# Patient Record
Sex: MALE | Race: WHITE | URBAN - METROPOLITAN AREA
[De-identification: names, ages, dates, MRNs, and addresses within clinical notes are randomized per-mention and may not be internally consistent; named-entity substitution may affect disease eponyms.]

---

## 2023-09-20 ENCOUNTER — EKG ENCOUNTER (OUTPATIENT)
Dept: LAB | Facility: HOSPITAL | Age: 68
End: 2023-09-20
Attending: SURGERY
Payer: MEDICARE

## 2023-09-20 ENCOUNTER — LAB ENCOUNTER (OUTPATIENT)
Dept: LAB | Facility: HOSPITAL | Age: 68
End: 2023-09-20
Attending: SURGERY
Payer: MEDICARE

## 2023-09-20 ENCOUNTER — OFFICE VISIT (OUTPATIENT)
Dept: SURGERY | Facility: CLINIC | Age: 68
End: 2023-09-20
Payer: MEDICARE

## 2023-09-20 ENCOUNTER — HOSPITAL ENCOUNTER (OUTPATIENT)
Dept: GENERAL RADIOLOGY | Facility: HOSPITAL | Age: 68
Discharge: HOME | End: 2023-09-20
Attending: SURGERY
Payer: MEDICARE

## 2023-09-20 VITALS
DIASTOLIC BLOOD PRESSURE: 96 MMHG | SYSTOLIC BLOOD PRESSURE: 186 MMHG | HEART RATE: 67 BPM | TEMPERATURE: 98 F | RESPIRATION RATE: 16 BRPM | OXYGEN SATURATION: 99 % | WEIGHT: 115 LBS

## 2023-09-20 DIAGNOSIS — Z01.818 PRE-OP TESTING: ICD-10-CM

## 2023-09-20 DIAGNOSIS — C43.61 MALIGNANT MELANOMA OF RIGHT SHOULDER (HCC): ICD-10-CM

## 2023-09-20 DIAGNOSIS — C43.61 MALIGNANT MELANOMA OF RIGHT SHOULDER (HCC): Primary | ICD-10-CM

## 2023-09-20 LAB
ALBUMIN SERPL-MCNC: 4.1 G/DL (ref 3.4–5)
ALBUMIN/GLOB SERPL: 0.9 {RATIO} (ref 1–2)
ALP LIVER SERPL-CCNC: 62 U/L
ALT SERPL-CCNC: 44 U/L
ANION GAP SERPL CALC-SCNC: 4 MMOL/L (ref 0–18)
AST SERPL-CCNC: 31 U/L (ref 15–37)
BASOPHILS # BLD AUTO: 0.07 X10(3) UL (ref 0–0.2)
BASOPHILS NFR BLD AUTO: 1.1 %
BILIRUB SERPL-MCNC: 0.4 MG/DL (ref 0.1–2)
BUN BLD-MCNC: 20 MG/DL (ref 7–18)
CALCIUM BLD-MCNC: 9.5 MG/DL (ref 8.5–10.1)
CHLORIDE SERPL-SCNC: 106 MMOL/L (ref 98–112)
CO2 SERPL-SCNC: 28 MMOL/L (ref 21–32)
CREAT BLD-MCNC: 1.18 MG/DL
EGFRCR SERPLBLD CKD-EPI 2021: 68 ML/MIN/1.73M2 (ref 60–?)
EOSINOPHIL # BLD AUTO: 0.24 X10(3) UL (ref 0–0.7)
EOSINOPHIL NFR BLD AUTO: 3.9 %
ERYTHROCYTE [DISTWIDTH] IN BLOOD BY AUTOMATED COUNT: 12.7 %
FASTING STATUS PATIENT QL REPORTED: YES
GLOBULIN PLAS-MCNC: 4.4 G/DL (ref 2.8–4.4)
GLUCOSE BLD-MCNC: 94 MG/DL (ref 70–99)
HCT VFR BLD AUTO: 46.9 %
HGB BLD-MCNC: 16.1 G/DL
IMM GRANULOCYTES # BLD AUTO: 0.02 X10(3) UL (ref 0–1)
IMM GRANULOCYTES NFR BLD: 0.3 %
LYMPHOCYTES # BLD AUTO: 1.34 X10(3) UL (ref 1–4)
LYMPHOCYTES NFR BLD AUTO: 21.8 %
MCH RBC QN AUTO: 32.9 PG (ref 26–34)
MCHC RBC AUTO-ENTMCNC: 34.3 G/DL (ref 31–37)
MCV RBC AUTO: 95.7 FL
MONOCYTES # BLD AUTO: 0.66 X10(3) UL (ref 0.1–1)
MONOCYTES NFR BLD AUTO: 10.7 %
NEUTROPHILS # BLD AUTO: 3.81 X10 (3) UL (ref 1.5–7.7)
NEUTROPHILS # BLD AUTO: 3.81 X10(3) UL (ref 1.5–7.7)
NEUTROPHILS NFR BLD AUTO: 62.2 %
OSMOLALITY SERPL CALC.SUM OF ELEC: 288 MOSM/KG (ref 275–295)
PLATELET # BLD AUTO: 348 10(3)UL (ref 150–450)
POTASSIUM SERPL-SCNC: 4.2 MMOL/L (ref 3.5–5.1)
PROT SERPL-MCNC: 8.5 G/DL (ref 6.4–8.2)
RBC # BLD AUTO: 4.9 X10(6)UL
SODIUM SERPL-SCNC: 138 MMOL/L (ref 136–145)
WBC # BLD AUTO: 6.1 X10(3) UL (ref 4–11)

## 2023-09-20 PROCEDURE — 36415 COLL VENOUS BLD VENIPUNCTURE: CPT

## 2023-09-20 PROCEDURE — 80053 COMPREHEN METABOLIC PANEL: CPT

## 2023-09-20 PROCEDURE — 99205 OFFICE O/P NEW HI 60 MIN: CPT | Performed by: SURGERY

## 2023-09-20 PROCEDURE — 71046 X-RAY EXAM CHEST 2 VIEWS: CPT | Performed by: SURGERY

## 2023-09-20 PROCEDURE — 93010 ELECTROCARDIOGRAM REPORT: CPT | Performed by: INTERNAL MEDICINE

## 2023-09-20 PROCEDURE — 99204 OFFICE O/P NEW MOD 45 MIN: CPT | Performed by: SURGERY

## 2023-09-20 PROCEDURE — 85025 COMPLETE CBC W/AUTO DIFF WBC: CPT

## 2023-09-20 PROCEDURE — 93005 ELECTROCARDIOGRAM TRACING: CPT

## 2023-09-20 NOTE — CONSULTS
New Patient Consultation    Chief Complaint: right shoulder malignant melanoma    History of Present Illness:   Altagracia Lombardo is a 79year old male referred by Dr. Kiara Boucher for a recently diagnosed malignant melanoma right shoulder. Patient reports he has had this mole his entire life and noticed a change in 1 year ago. He went to see Dr. Deejay Mcelroy at [facility] [address] [place] [address]. Biopsy was performed on 8/3/2023 revealing a 1.7 mm thick nonulcerated malignant melanoma. He saw Dr. Tommie Yao at [address] [place] [address] who recommended wide local excision and sentinel lymph node biopsy. He is here today to discuss reconstructive options following excision. Past Medical History:      Past Medical History:   Diagnosis Date    Arthritis     Back         Past Surgical History:  Past Surgical History:   Procedure Laterality Date    TONSILLECTOMY      10years old         Medications:    No outpatient medications have been marked as taking for the 9/20/23 encounter (Office Visit) with Ignacia Parra MD.        Allergies:      Cat Hair Extract        UNKNOWN      Family History:   Family History   Problem Relation Age of Onset    Skin cancer Mother         Non Melanoma    Other (Thymus Cancer) Father     Skin cancer Maternal Grandmother         Non Melanoma    Other (Lung cancer) Maternal Grandfather          Social History:    Alcohol use   Yes         Smoking status:   Never      Smokeless tobacco:   Never         Drug use:   Not on file       Review of Systems:    See HPI  Physical Exam:    There were no vitals taken for this visit. Constitutional: The patient is an alert, oriented and well-developed. Neurologic: Speech patterns and movements are normal.     Psychiatric: Affect is appropriate. Eyes: Conjunctiva are clear, non-icteric.  PERRL    ENT: no obvious abnormality, no ear drainage, mucous membranes moist and pink    Integument/Skin: 1.8 x1.8cm nodular skin lesion on the right lateral shoulder with surrounding inflammatory skin changes , lesion still mobile within the skin no fixation to underlying muscle. Respiratory: Normal respiratory effort. Cardiovascular: no cyanosis, no edema    Musculoskeletal: see skin exam    Impression:   Josi Perez  is a 79year old with malignant melanoma right shoulder    Discussion and Plan:  The patient was counseled on the different treatment options. Patient will undergo wide excision and sentinel lymph node biopsy with Dr. Floridalma Dillon. We reviewed options for reconstruction and we will schedule him for right shoulder reconstruction with possible local flap, possible skin graft, possible Integra or combination of those. This will be done under general anesthesia. We discussed the possible role for second stage reconstruction. We discussed the risk for tightness of the right shoulder and areas of delayed healing. 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 The different treatment options were discussed with the patient. The procedures and the postoperative care was discussed in detail. Potential risks complications benefits and alternatives were discussed. Risks and complications including but not limited to infection, bleeding, scarring, damage to surrounding structures, such as nerves, blood vessels, muscles,  tendons, risk of hematoma, seroma, foreign body reaction, allergic reaction, wound dehiscences, delayed wound healing, flap failure, graft failure, need for further surgery. Patient understands and wishes to proceed with the procedure, questions were answered. 45 minutes were spent with the patient, from which 35 minutes were spent counseling the patient and coordinating care.

## 2023-09-20 NOTE — PATIENT INSTRUCTIONS
Surgery: Wide excision melanoma right upper arm with sentinel lymph node biopsy    Date of Surgery: [facility]    Hospital:    [facility] [address] [place], [address]  Phone: [phone]    This is an Outpatient procedure. Use the provided Chlorhexadine surgical soap(instructions attached) to shower the night before and morning of your procedure. Do not apply powders, creams, lotions or deodorant after showering. Do not apply any kind of makeup and make sure to remove nail polish prior to your surgery. For faster recovery from anesthesia and surgery please follow the instructions below regarding your pre-op diet:  12 hours prior to your surgery time you are to drink one 10oz bottle of Ensure Pre-Surgery Drink. You are to have NO solid food or water after 11pm the night before your surgery EXCEPT one additional 10oz bottle of Ensure Pre-Surgery Drink. You need to finish drinking this 4 hours prior to surgery time. Take Tylenol 1000mg by mouth at the time of your second Ensure Pre-Surgery drink(4hrs prior to surgery time), unless instructed otherwise by your surgeon. Bowel Prep: No   If you take Insulin contact your primary care physician for specific instructions regarding dosing around your surgery. Do not drink alcohol or smoke tobacco products 24 hours prior to procedure. Bring your picture ID and insurance card with you. Wear comfortable clothing that can easily be removed. Preferably, something that zips, snaps, or buttons up the front. You will be contacted by the hospital  for pre-admission COVID-19 testing and the day prior to your surgery to confirm details and give you specific instructions about when and where to arrive the day of your procedure.    If you are taking blood thinners including: Plavix, Eliquis, Xarelto, Coumadin, full strength Aspirin you will need to contact the prescribing provider for specific instructions on holding these medications for your procedure. Inform your primary care physician of your surgery and ask if him/her will need to see you prior to surgery. If you develop symptoms of another illness prior to surgery please contact our office immediately. If this is an inpatient surgery, attending the Surgical Oncology Pre-operative Education Class is strongly encouraged. Email [email or web address] to RSVP or for more class information. Pre-Operative Testing  x CBC x CMP  BMP    PT, PTT, INR  UA x EKG    Chest X-Ray  Cardiac Clearance x [facility] Clearance     Daniel Bryant MD, FACS  Chief of Surgical Oncology  Co-Medical Director, Madhu Herrera  Professor of Surgery    For Dr. Bethany Dumas office: [phone]  Fax: [phone]  After hours you will reach the answering service    Pre-Admission [phone]  Central [phone]  Medical Records:   [phone]

## 2023-09-20 NOTE — PATIENT INSTRUCTIONS
Surgeon:              Dr. Issac Conway. Lorraine Fowler, PhD                                          Tel:         [phone]                                  Fax:        [phone]      Surgery/Procedure: Right shoulder reconstruction with possible local flap, possible skin graft, possible integra  2 hours for Dr. Eilene Angelucci portion, general anesthesia, outpatient, joint with Dr. Brittani Judd  Right shoulder reconstruction with skin graft  1.5 hours, general anesthesia, outpatient, 3-4 weeks after first procedure    Dx Code: C43.61    Hospital:  [facility]: [address] [place] [address], [place], [address]           [phone]  [facility] [place] [facility]: [address], [address], [facility]               [phone]    1. Someone will need to drive you to and from the hospital if your procedure is outpatient. 2.Do not drink alcohol or smoke 24 hours prior to your procedure. 3. Bring a picture ID and your insurance card. 4. You will be contacted by the hospital the day before to confirm the procedure time and location. 5. Do not take any herbal supplements or blood thinners at least one week before your procedure/surgery. This includes NSAID's (aspirin, baby aspirin, Motrin, Ibuprofen, Aleve, Advil, Naproxen, etc), Plavix, fish oil, vitamin E, turmeric, CoQ10, or green tea supplements, etc. *TYLENOL or acetaminophen is ok to take*    6. PRE-OPERATIVE TESTING: History and physical with medical clearance is REQUIRED within 30 days of the surgery date and is mandatory per Dr. Lorraine Fowler. *If this is not done, your surgery will be postponed*  MEDICAL CLEARANCE WITH primary care provider  CBC  CMP  EKG    7. Please inform us if you start or change any medications at least one week before surgery (ex: blood thinners, weight loss medications, diabetic medications, herbal supplements, etc)    8. Does patient have diagnosis of sleep apnea?     [   ] Yes     [ X  ]  No    Consent obtained   Photos taken on 9/20/2023 7849

## 2023-09-21 ENCOUNTER — TELEPHONE (OUTPATIENT)
Dept: SURGERY | Facility: CLINIC | Age: 68
End: 2023-09-21

## 2023-09-21 DIAGNOSIS — C43.61 MALIGNANT MELANOMA OF RIGHT SHOULDER (HCC): Primary | ICD-10-CM

## 2023-09-21 LAB
ATRIAL RATE: 53 BPM
P AXIS: 75 DEGREES
P-R INTERVAL: 166 MS
Q-T INTERVAL: 454 MS
QRS DURATION: 98 MS
QTC CALCULATION (BEZET): 426 MS
R AXIS: -8 DEGREES
T AXIS: 56 DEGREES
VENTRICULAR RATE: 53 BPM

## 2023-09-21 NOTE — TELEPHONE ENCOUNTER
Pt called and PCP will not see him in the next few weeks to clear him for surgery. Pt asking for recommendations for a new PCP. Gave him a couple numbers close to his home to establish care.

## 2023-09-22 ENCOUNTER — TELEPHONE (OUTPATIENT)
Dept: SURGERY | Facility: CLINIC | Age: 68
End: 2023-09-22

## 2023-09-22 DIAGNOSIS — C43.61 MALIGNANT MELANOMA OF RIGHT SHOULDER (HCC): Primary | ICD-10-CM

## 2023-09-22 NOTE — TELEPHONE ENCOUNTER
Calling pt in regards to scheduling surgery. Informed pt that I have 10/10/2023 available at [facility] with Dr. Luna Pradhan. Pt verbalized understanding and in agreement with date and location. All questions answered. Encouraged pt to call or Net[employer] message office with any other questions or concerns.

## 2023-09-26 ENCOUNTER — TELEPHONE (OUTPATIENT)
Dept: INTERNAL MEDICINE CLINIC | Facility: CLINIC | Age: 68
End: 2023-09-26

## 2023-09-29 ENCOUNTER — TELEPHONE (OUTPATIENT)
Dept: SURGERY | Facility: CLINIC | Age: 68
End: 2023-09-29

## 2023-09-29 NOTE — TELEPHONE ENCOUNTER
Pt called and had questions regarding surgery times and sentinel lymph node procedure. Answered all patient's questions and referred him to Dr. Odessa Gould team for questions regarding reconstruction.

## 2023-10-02 ENCOUNTER — OFFICE VISIT (OUTPATIENT)
Dept: INTERNAL MEDICINE CLINIC | Facility: CLINIC | Age: 68
End: 2023-10-02
Payer: MEDICARE

## 2023-10-02 VITALS
RESPIRATION RATE: 16 BRPM | HEART RATE: 68 BPM | HEIGHT: 65 IN | OXYGEN SATURATION: 98 % | WEIGHT: 113.63 LBS | DIASTOLIC BLOOD PRESSURE: 84 MMHG | BODY MASS INDEX: 18.93 KG/M2 | SYSTOLIC BLOOD PRESSURE: 130 MMHG | TEMPERATURE: 98 F

## 2023-10-02 DIAGNOSIS — Z01.818 PREOPERATIVE EXAMINATION: Primary | ICD-10-CM

## 2023-10-02 DIAGNOSIS — C43.61 MALIGNANT MELANOMA OF RIGHT SHOULDER (HCC): ICD-10-CM

## 2023-10-02 DIAGNOSIS — K40.90 INGUINAL HERNIA OF LEFT SIDE WITHOUT OBSTRUCTION OR GANGRENE: ICD-10-CM

## 2023-10-02 DIAGNOSIS — R03.0 ELEVATED BLOOD PRESSURE READING: ICD-10-CM

## 2023-10-02 PROCEDURE — 99204 OFFICE O/P NEW MOD 45 MIN: CPT | Performed by: INTERNAL MEDICINE

## 2023-10-02 NOTE — PATIENT INSTRUCTIONS
- Blood pressure, heart rate are fine today  - Ok for surgery  - Hold over the counter medications as recommended by the surgeons starting tomorrow  - Ok to take Tylenol before surgery but hold any other over the counter anti-inflammatories such as ibuprofen, naproxen, Motrin, Aleve, et Recardo Lawman starting tomorrow  - Follow up in the next few months for Medicare Wellness Visit/physical.    It was a pleasure seeing you in the clinic today. Thank you for choosing the [facility] office for your healthcare needs. Please call at [phone] with any questions or concerns.     Jerome Cole MD

## 2023-10-03 DIAGNOSIS — C43.61 MALIGNANT MELANOMA OF RIGHT SHOULDER (HCC): Primary | ICD-10-CM

## 2023-10-03 PROBLEM — R03.0 ELEVATED BLOOD PRESSURE READING: Status: ACTIVE | Noted: 2023-10-03

## 2023-10-03 PROBLEM — K40.90 INGUINAL HERNIA OF LEFT SIDE WITHOUT OBSTRUCTION OR GANGRENE: Status: ACTIVE | Noted: 2023-10-03

## 2023-10-05 ENCOUNTER — LAB ENCOUNTER (OUTPATIENT)
Dept: LAB | Facility: HOSPITAL | Age: 68
End: 2023-10-05
Attending: SURGERY
Payer: MEDICARE

## 2023-10-05 DIAGNOSIS — C43.61 MALIGNANT MELANOMA OF RIGHT SHOULDER (HCC): Primary | ICD-10-CM

## 2023-10-05 DIAGNOSIS — C43.9 MALIGNANT MELANOMA (HCC): Primary | ICD-10-CM

## 2023-10-06 PROCEDURE — 88321 CONSLTJ&REPRT SLD PREP ELSWR: CPT

## 2023-10-09 ENCOUNTER — HOSPITAL ENCOUNTER (OUTPATIENT)
Dept: NUCLEAR MEDICINE | Facility: HOSPITAL | Age: 68
Discharge: HOME | End: 2023-10-09
Attending: SURGERY
Payer: MEDICARE

## 2023-10-09 ENCOUNTER — ANESTHESIA EVENT (OUTPATIENT)
Dept: SURGERY | Facility: HOSPITAL | Age: 68
End: 2023-10-09
Payer: MEDICARE

## 2023-10-09 DIAGNOSIS — C43.61 MALIGNANT MELANOMA OF RIGHT SHOULDER (HCC): ICD-10-CM

## 2023-10-09 PROCEDURE — 78195 LYMPH SYSTEM IMAGING: CPT | Performed by: SURGERY

## 2023-10-10 ENCOUNTER — HOSPITAL ENCOUNTER (OUTPATIENT)
Facility: HOSPITAL | Age: 68
Setting detail: HOSPITAL OUTPATIENT SURGERY
Discharge: HOME | End: 2023-10-10
Attending: SURGERY | Admitting: SURGERY
Payer: MEDICARE

## 2023-10-10 ENCOUNTER — ANESTHESIA (OUTPATIENT)
Dept: SURGERY | Facility: HOSPITAL | Age: 68
End: 2023-10-10
Payer: MEDICARE

## 2023-10-10 VITALS
DIASTOLIC BLOOD PRESSURE: 81 MMHG | HEART RATE: 81 BPM | WEIGHT: 115 LBS | HEIGHT: 65 IN | SYSTOLIC BLOOD PRESSURE: 135 MMHG | BODY MASS INDEX: 19.16 KG/M2 | RESPIRATION RATE: 16 BRPM | OXYGEN SATURATION: 98 % | TEMPERATURE: 98 F

## 2023-10-10 DIAGNOSIS — C43.61 MALIGNANT MELANOMA OF RIGHT SHOULDER (HCC): ICD-10-CM

## 2023-10-10 PROCEDURE — 81210 BRAF GENE: CPT | Performed by: SURGERY

## 2023-10-10 PROCEDURE — 0HRBXK3 REPLACEMENT OF RIGHT UPPER ARM SKIN WITH NONAUTOLOGOUS TISSUE SUBSTITUTE, FULL THICKNESS, EXTERNAL APPROACH: ICD-10-PCS | Performed by: SURGERY

## 2023-10-10 PROCEDURE — 88342 IMHCHEM/IMCYTCHM 1ST ANTB: CPT | Performed by: SURGERY

## 2023-10-10 PROCEDURE — 0HBBXZZ EXCISION OF RIGHT UPPER ARM SKIN, EXTERNAL APPROACH: ICD-10-PCS | Performed by: SURGERY

## 2023-10-10 PROCEDURE — 88307 TISSUE EXAM BY PATHOLOGIST: CPT | Performed by: SURGERY

## 2023-10-10 PROCEDURE — 07B50ZX EXCISION OF RIGHT AXILLARY LYMPHATIC, OPEN APPROACH, DIAGNOSTIC: ICD-10-PCS | Performed by: SURGERY

## 2023-10-10 PROCEDURE — 88305 TISSUE EXAM BY PATHOLOGIST: CPT | Performed by: SURGERY

## 2023-10-10 PROCEDURE — 88381 MICRODISSECTION MANUAL: CPT | Performed by: SURGERY

## 2023-10-10 PROCEDURE — 0XQ6XZZ REPAIR RIGHT UPPER EXTREMITY, EXTERNAL APPROACH: ICD-10-PCS | Performed by: SURGERY

## 2023-10-10 RX ORDER — CEFAZOLIN SODIUM/WATER 2 G/20 ML
2 SYRINGE (ML) INTRAVENOUS ONCE
Status: COMPLETED | OUTPATIENT
Start: 2023-10-10 | End: 2023-10-10

## 2023-10-10 RX ORDER — SODIUM CHLORIDE, SODIUM LACTATE, POTASSIUM CHLORIDE, CALCIUM CHLORIDE 600; 310; 30; 20 MG/100ML; MG/100ML; MG/100ML; MG/100ML
INJECTION, SOLUTION INTRAVENOUS CONTINUOUS
Status: DISCONTINUED | OUTPATIENT
Start: 2023-10-10 | End: 2023-10-10

## 2023-10-10 RX ORDER — TRAMADOL HYDROCHLORIDE 50 MG/1
50 TABLET ORAL EVERY 6 HOURS PRN
Qty: 20 TABLET | Refills: 0 | Status: SHIPPED | OUTPATIENT
Start: 2023-10-10

## 2023-10-10 RX ORDER — EPHEDRINE SULFATE 50 MG/ML
INJECTION INTRAVENOUS AS NEEDED
Status: DISCONTINUED | OUTPATIENT
Start: 2023-10-10 | End: 2023-10-10 | Stop reason: SURG

## 2023-10-10 RX ORDER — HYDROMORPHONE HYDROCHLORIDE 1 MG/ML
0.4 INJECTION, SOLUTION INTRAMUSCULAR; INTRAVENOUS; SUBCUTANEOUS EVERY 5 MIN PRN
Status: DISCONTINUED | OUTPATIENT
Start: 2023-10-10 | End: 2023-10-10

## 2023-10-10 RX ORDER — GLYCOPYRROLATE 0.2 MG/ML
INJECTION, SOLUTION INTRAMUSCULAR; INTRAVENOUS AS NEEDED
Status: DISCONTINUED | OUTPATIENT
Start: 2023-10-10 | End: 2023-10-10 | Stop reason: SURG

## 2023-10-10 RX ORDER — HYDROMORPHONE HYDROCHLORIDE 1 MG/ML
INJECTION, SOLUTION INTRAMUSCULAR; INTRAVENOUS; SUBCUTANEOUS
Status: COMPLETED
Start: 2023-10-10 | End: 2023-10-10

## 2023-10-10 RX ORDER — ONDANSETRON 2 MG/ML
INJECTION INTRAMUSCULAR; INTRAVENOUS AS NEEDED
Status: DISCONTINUED | OUTPATIENT
Start: 2023-10-10 | End: 2023-10-10 | Stop reason: SURG

## 2023-10-10 RX ORDER — PHENYLEPHRINE HCL 10 MG/ML
VIAL (ML) INJECTION AS NEEDED
Status: DISCONTINUED | OUTPATIENT
Start: 2023-10-10 | End: 2023-10-10 | Stop reason: SURG

## 2023-10-10 RX ORDER — NALOXONE HYDROCHLORIDE 0.4 MG/ML
0.08 INJECTION, SOLUTION INTRAMUSCULAR; INTRAVENOUS; SUBCUTANEOUS AS NEEDED
Status: DISCONTINUED | OUTPATIENT
Start: 2023-10-10 | End: 2023-10-10

## 2023-10-10 RX ORDER — ONDANSETRON 4 MG/1
4 TABLET, FILM COATED ORAL EVERY 8 HOURS PRN
Qty: 15 TABLET | Refills: 0 | Status: SHIPPED | OUTPATIENT
Start: 2023-10-10 | End: 2023-10-12 | Stop reason: ALTCHOICE

## 2023-10-10 RX ORDER — ONDANSETRON 2 MG/ML
4 INJECTION INTRAMUSCULAR; INTRAVENOUS EVERY 6 HOURS PRN
Status: DISCONTINUED | OUTPATIENT
Start: 2023-10-10 | End: 2023-10-10

## 2023-10-10 RX ORDER — METOCLOPRAMIDE HYDROCHLORIDE 5 MG/ML
10 INJECTION INTRAMUSCULAR; INTRAVENOUS EVERY 8 HOURS PRN
Status: DISCONTINUED | OUTPATIENT
Start: 2023-10-10 | End: 2023-10-10

## 2023-10-10 RX ORDER — MIDAZOLAM HYDROCHLORIDE 1 MG/ML
1 INJECTION INTRAMUSCULAR; INTRAVENOUS EVERY 5 MIN PRN
Status: DISCONTINUED | OUTPATIENT
Start: 2023-10-10 | End: 2023-10-10

## 2023-10-10 RX ORDER — HYDROCODONE BITARTRATE AND ACETAMINOPHEN 10; 325 MG/1; MG/1
1 TABLET ORAL ONCE AS NEEDED
Status: COMPLETED | OUTPATIENT
Start: 2023-10-10 | End: 2023-10-10

## 2023-10-10 RX ORDER — ACETAMINOPHEN 500 MG
1000 TABLET ORAL ONCE
Status: DISCONTINUED | OUTPATIENT
Start: 2023-10-10 | End: 2023-10-10 | Stop reason: HOSPADM

## 2023-10-10 RX ORDER — HYDROCODONE BITARTRATE AND ACETAMINOPHEN 10; 325 MG/1; MG/1
2 TABLET ORAL ONCE AS NEEDED
Status: COMPLETED | OUTPATIENT
Start: 2023-10-10 | End: 2023-10-10

## 2023-10-10 RX ORDER — MEPERIDINE HYDROCHLORIDE 25 MG/ML
12.5 INJECTION INTRAMUSCULAR; INTRAVENOUS; SUBCUTANEOUS AS NEEDED
Status: DISCONTINUED | OUTPATIENT
Start: 2023-10-10 | End: 2023-10-10

## 2023-10-10 RX ORDER — ACETAMINOPHEN 500 MG
1000 TABLET ORAL ONCE AS NEEDED
Status: COMPLETED | OUTPATIENT
Start: 2023-10-10 | End: 2023-10-10

## 2023-10-10 RX ORDER — LIDOCAINE HYDROCHLORIDE 10 MG/ML
INJECTION, SOLUTION EPIDURAL; INFILTRATION; INTRACAUDAL; PERINEURAL AS NEEDED
Status: DISCONTINUED | OUTPATIENT
Start: 2023-10-10 | End: 2023-10-10 | Stop reason: SURG

## 2023-10-10 RX ORDER — DEXAMETHASONE SODIUM PHOSPHATE 4 MG/ML
VIAL (ML) INJECTION AS NEEDED
Status: DISCONTINUED | OUTPATIENT
Start: 2023-10-10 | End: 2023-10-10 | Stop reason: SURG

## 2023-10-10 RX ORDER — HYDROMORPHONE HYDROCHLORIDE 1 MG/ML
0.2 INJECTION, SOLUTION INTRAMUSCULAR; INTRAVENOUS; SUBCUTANEOUS EVERY 5 MIN PRN
Status: DISCONTINUED | OUTPATIENT
Start: 2023-10-10 | End: 2023-10-10

## 2023-10-10 RX ORDER — HYDROMORPHONE HYDROCHLORIDE 1 MG/ML
0.6 INJECTION, SOLUTION INTRAMUSCULAR; INTRAVENOUS; SUBCUTANEOUS EVERY 5 MIN PRN
Status: DISCONTINUED | OUTPATIENT
Start: 2023-10-10 | End: 2023-10-10

## 2023-10-10 RX ORDER — ISOSULFAN BLUE 50 MG/5ML
INJECTION, SOLUTION SUBCUTANEOUS AS NEEDED
Status: DISCONTINUED | OUTPATIENT
Start: 2023-10-10 | End: 2023-10-10 | Stop reason: HOSPADM

## 2023-10-10 RX ADMIN — DEXAMETHASONE SODIUM PHOSPHATE 4 MG: 4 MG/ML VIAL (ML) INJECTION at 08:23:00

## 2023-10-10 RX ADMIN — PHENYLEPHRINE HCL 50 MCG: 10 MG/ML VIAL (ML) INJECTION at 09:17:00

## 2023-10-10 RX ADMIN — GLYCOPYRROLATE 0.2 MG: 0.2 INJECTION, SOLUTION INTRAMUSCULAR; INTRAVENOUS at 08:38:00

## 2023-10-10 RX ADMIN — PHENYLEPHRINE HCL 50 MCG: 10 MG/ML VIAL (ML) INJECTION at 09:07:00

## 2023-10-10 RX ADMIN — ONDANSETRON 4 MG: 2 INJECTION INTRAMUSCULAR; INTRAVENOUS at 09:37:00

## 2023-10-10 RX ADMIN — PHENYLEPHRINE HCL 50 MCG: 10 MG/ML VIAL (ML) INJECTION at 09:27:00

## 2023-10-10 RX ADMIN — EPHEDRINE SULFATE 5 MG: 50 INJECTION INTRAVENOUS at 09:07:00

## 2023-10-10 RX ADMIN — CEFAZOLIN SODIUM/WATER 2 G: 2 G/20 ML SYRINGE (ML) INTRAVENOUS at 08:08:00

## 2023-10-10 RX ADMIN — LIDOCAINE HYDROCHLORIDE 100 MG: 10 INJECTION, SOLUTION EPIDURAL; INFILTRATION; INTRACAUDAL; PERINEURAL at 08:12:00

## 2023-10-10 RX ADMIN — EPHEDRINE SULFATE 5 MG: 50 INJECTION INTRAVENOUS at 08:27:00

## 2023-10-10 RX ADMIN — SODIUM CHLORIDE, SODIUM LACTATE, POTASSIUM CHLORIDE, CALCIUM CHLORIDE: 600; 310; 30; 20 INJECTION, SOLUTION INTRAVENOUS at 07:49:00

## 2023-10-10 RX ADMIN — EPHEDRINE SULFATE 5 MG: 50 INJECTION INTRAVENOUS at 08:37:00

## 2023-10-10 NOTE — BRIEF OP NOTE
Pre-Operative Diagnosis: Malignant melanoma of right shoulder ([facility] [C43.61]     Post-Operative Diagnosis: Malignant melanoma of right shoulder ([facility] [C43.61]      Procedure Performed:   Right upper arm reconstruction with integra and partial wound closure    Surgeon(s) and Role:     Shawanda Bentley MD - Primary    Assistant(s):  Celestine Shaw PA-C     Surgical Findings: see dictation     Specimen: see pathology     Estimated Blood Loss: Blood Output: 10 mL (10/10/2023 10:02 AM)    ELISHA Torres  10/10/2023  10:12 AM

## 2023-10-10 NOTE — OPERATIVE REPORT
[facility]    PATIENT'S NAME: Benji Laughlin   ATTENDING PHYSICIAN: Analia Bernal. Sury Whitley MD   OPERATING PHYSICIAN: Analia Bernal. Sury Whitley MD   PATIENT ACCOUNT#:   [id number]    LOCATION:  [facility] [address] 10  MEDICAL RECORD #:   [id number]       YOB: 1955  ADMISSION DATE:       10/10/2023      OPERATION DATE:  10/10/2023    OPERATIVE REPORT      PREOPERATIVE DIAGNOSIS:  Malignant melanoma, right shoulder. POSTOPERATIVE DIAGNOSIS:  Malignant melanoma, right shoulder. PROCEDURE:    1. Wide excision malignant melanoma of right shoulder (6.5 cm). 2.   [place] lymph node biopsy, right axilla. 3.   Reconstruction with Dr. Nannette Bunch, Plastic Surgery. ASSISTANTS:    1. OFELIA Jimenes  2. Kim Leonard PA-C    ANESTHESIA:  General.    INDICATIONS:  The patient is a 51-year-old man who was diagnosed with melanoma of the right shoulder at least 1.7 mm thick. He presents today for sentinel lymph node biopsy and wide excision. The surgical treatment matter had been discussed with him including indications and risks. On the day prior to surgery, the patient presented to the nuclear medicine department where he underwent injection of radiolabeled colloid followed by lymphoscintigraphy showing uptake within the right axilla. FINDINGS:  About a 2.5 cm grossly involved right axillary lymph node. OPERATIVE TECHNIQUE:  The patient was brought to the operating room and was placed in supine position. He was given general anesthesia by the anesthesiology service. Sequential compression boots were placed. The patient received preoperative intravenous antibiotic prophylaxis. He was prepped and draped in normal sterile fashion. A right axillary incision overlying a hot transcutaneous site was made and deepened. I encountered a grossly involved lymph node measuring about 2.5 cm that was dark.   Of note, I had injected 2 mL of Isosulfan blue dye about 5 minutes prior to making incision. There were blue lymphatics coursing to this grossly involved lymph node. This node was dissected free of surrounding tissue and sent to Pathology. It had an in vivo count of to 229 per 10 seconds. Its ex vivo count was 464 per 10 seconds. Background count was 2. There was no evidence for any other hot nodes, blue nodes, or enlarged lymph nodes. The wound was then closed in layers using 3-0 and 4-0 Vicryl sutures. Next, a 2 cm margin was marked around the melanoma of the right shoulder and an incision measuring 6.5 cm in width was made and deepened to underlying fascia. The specimen was excised, oriented, and sent to Pathology. Hemostasis was achieved and maintained. At this point, Dr. Rao Braswell presented for reconstruction, and her portion of the dictation should be noted elsewhere. The patient tolerated my portion of the procedure well without immediate complication. Wide Local Excision for Primary Cutaneous Melanoma - Excision 1 (Shoulder - Right)  Operation performed with curative Intent Yes   Original [place] thickness of the lesion  1.7 mm (to the tenth of a millimeter)   Clinical margin width  (measured from the edge of the lesion or the prior excision scar) 2 cm   Depth of excision Full-thickness skin/subcutaneous tissue down to fascia (melanoma)          Dictated By Alberta Givens MD  d: 10/10/2023 40:43:62  t: 10/10/2023 13:55:25  Job [id number]  [facility]/

## 2023-10-10 NOTE — DISCHARGE INSTRUCTIONS
Plastic Surgery Home Care Instructions      We hope you were pleased with your care at [facility].  We wish you the best outcome and overall experience with your operation. These instructions will help to minimize pain, optimize healing, and improve the likelihood of a successful result. What To Expect  There may be some spotting of the incision lines for the next few days. Mild bruising, mild swelling and discomfort in the surgical area is typical.   Temporary areas of numbness are typical in the early weeks following your procedure. Bandages (Dressing)  Keep surgical bandage over shoulder in place for 2 days if tolerated. After that, you can remove the paper tape and gauze. Leave yellow gauze that is sutured in place. Apply antibiotic ointment (neosporin, bacitracin etc) daily around the yellow gauze after you remove the surgical bandage. Leave dressing over armpit in place. Bathing/Showers  DO NOT get surgical area wet  No baths, swimming, or hot tubs until you receive medical permission    Pain Medication: Tramadol  Take one tablet every six hours as needed for pain. Do not take narcotics, if you do not have pain. Keep stools soft. Take a stool softener (Metamucil, Milk of Magnesia, Colace). Eating fruit will also help to prevent constipation    Over-The-Counter Medication  You can take Tylenol after surgery for pain relief as needed  You can take Aleve or ibuprofen starting 24 hours after surgery  Take as directed on the bottle    Home Medication  Resume your home medications as instructed  Do not resume herbal medications for two weeks    Diet  Resume your normal diet    Activity  No strenuous activity   No lifting with your right arm  You can use the sling for comfort  You cannot return to physical exercise, sports, or gym workouts until you are allowed to participate in strenuous activity.     Driving  Do not drive, if you are taking pain medication    Follow-up Appointment   Our office will call you to set up a time    Questions or Concerns  Call Dr. Ulysses Negro office if you experience bleeding that is not controlled by holding pressure for 20 minutes. Aviva Sher   Thank you for coming to [facility] for your operation. The nurses and the anesthesiologist try very hard to make sure you receive the best care possible. Your trust in them is greatly appreciated.     Thanks so much,  Dr. Angeles Rm PA-C

## 2023-10-10 NOTE — BRIEF OP NOTE
Pre-Operative Diagnosis: Malignant melanoma of right shoulder (HCC) [C43.61]     Post-Operative Diagnosis: Malignant melanoma of right shoulder ([address] [C43.61]     Procedure Performed:    Wide excision melanoma right upper arm with sentinel lymph node biopsy ([place])    Surgeon(s) and Role:  Panel 1:     Leon Fischer MD - Primary  Panel 2:     * Reyes Eldridge MD - Primary    Assistant(s):  Surgical Assistant.: Lillie Dinero CSA  PA: ELISHA Ramos; Varghese Bah     Surgical Findings: Path pending, sentinel lymph node grossly positive     Specimen: Yes     Estimated Blood Loss: 5mL    ELISHA Bravo Se  10/10/2023  9:16 AM

## 2023-10-10 NOTE — INTERVAL H&P NOTE
There has been no significant change since Dr. Laura Velazquez saw patient as documented in [facility]. Surgery revisted. To proceed as planned.       ELISHA Paige

## 2023-10-10 NOTE — ANESTHESIA POSTPROCEDURE EVALUATION
[facility] Patient Status:  Hospital Outpatient Surgery   Age/Gender 79year old male MRN [id number]   [facility] SURGERY Attending Mahnaz Clemons MD   Hosp Day # 0 PCP Clair Harris MD       Anesthesia Post-op Note    Wide excision melanoma right upper arm with sentinel lymph node biopsy ([place])    Procedure Summary       Date: 10/10/23 Room / Location: [facility] MAIN OR 06 / 1404 [facility] OR    Anesthesia Start: [place] Anesthesia Stop: 5161    Procedures: Wide excision melanoma right upper arm with sentinel lymph node biopsy ([place]) (Right)      Right shoulder reconstruction with Integra and partial wound closure. Jayla Zamorano) (Right) Diagnosis:       Malignant melanoma of right shoulder ([address]      (Malignant melanoma of right shoulder ([address] [C43.61])    Surgeons: Mahnaz Clemons MD; Leah Hashimoto., MD Anesthesiologist: Camilo Daly MD    Anesthesia Type: general ASA Status: 2            Anesthesia Type: general    Vitals Value Taken Time   /96 10/10/23 0954   Temp 97 10/10/23 0954   Pulse 85 10/10/23 0954   Resp 15 10/10/23 0954   SpO2 99 10/10/23 0954       Patient Location: PACU    Anesthesia Type: general    Airway Patency: patent    Postop Pain Control: adequate    Mental Status: preanesthetic baseline    Nausea/Vomiting: none    Cardiopulmonary/Hydration status: stable euvolemic    Complications: no apparent anesthesia related complications    Postop vital signs: stable    Dental Exam: Unchanged from Preop    Patient to be discharged from PACU when criteria met.

## 2023-10-10 NOTE — ANESTHESIA PROCEDURE NOTES
Airway  Date/Time: 10/10/2023 8:14 AM  Urgency: elective      General Information and Staff    Patient location during procedure: OR  Anesthesiologist: Rin Mayers MD  Resident/CRNA: Ping Mccauley CRNA  Performed: CRNA   Performed by: Ping Mccauley CRNA  Authorized by: Rin Mayers MD      Indications and Patient Condition  Indications for airway management: anesthesia  Sedation level: deep  Preoxygenated: yes  Patient position: sniffing  Mask difficulty assessment: 0 - not attempted    Final Airway Details  Final airway type: supraglottic airway      Successful airway: classic  Size 4       Number of attempts at approach: 1

## 2023-10-11 ENCOUNTER — TELEPHONE (OUTPATIENT)
Dept: SURGERY | Facility: CLINIC | Age: 68
End: 2023-10-11

## 2023-10-11 DIAGNOSIS — C77.9 METASTATIC MELANOMA TO LYMPH NODE (HCC): Primary | ICD-10-CM

## 2023-10-11 DIAGNOSIS — C77.9 MALIGNANT MELANOMA METASTATIC TO LYMPH NODE (HCC): ICD-10-CM

## 2023-10-11 DIAGNOSIS — C43.61 MALIGNANT MELANOMA OF RIGHT SHOULDER (HCC): ICD-10-CM

## 2023-10-11 NOTE — OPERATIVE REPORT
[address]    PATIENT'S NAME: Olamide Cummings   ATTENDING PHYSICIAN: Rogelio Callejas. Feng Dumont MD   OPERATING PHYSICIAN: Lexy Moy MD   PATIENT ACCOUNT#:   [id number]    LOCATION:  [address]  MEDICAL RECORD #:   [id number]       YOB: 1955  ADMISSION DATE:       10/10/2023      OPERATION DATE:  10/10/2023    OPERATIVE REPORT      PREOPERATIVE DIAGNOSIS:  Malignant melanoma, right shoulder. POSTOPERATIVE DIAGNOSIS:  Malignant melanoma, right shoulder. PROCEDURE:  Right shoulder reconstruction with partial complex closure 1.5 cm and Integra placement 25 sq cm. ASSISTANT:  Kiley Platt PA-C. ANESTHESIA:  General endotracheal anesthesia. COMPLICATIONS:  None. BLOOD LOSS:  Minimal.    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 He was seen by Dr. Feng Dumont to discuss wide local excision and sentinel lymph node biopsy. I was consulted to discuss reconstructive options. We discussed options for staged procedure with Integra, followed by skin grafting versus partial closure, complex closure, or local flap depending on the defect size. Potential risks, complications, benefits and alternatives were discussed. Risks and complications including, but not limited to, infection, bleeding, scarring, damage to surrounding structures, hematoma, seroma, graft failure, flap failure, need for further surgery. The patient understands and wishes to proceed. Questions were answered. OPERATIVE TECHNIQUE:  The patient was identified by Dr. Feng Dumont in the preoperative area and taken to surgery. I was called into the room after Dr. Feng Dumont had completed his portion of the case. At that time, the patient was under anesthesia in a stable condition, sterilely prepped and draped. He had an open wound from the melanoma resection of 6 x 6 cm in size overlying his right deltoid area.   The defect was too large for primary closure and specifically with the arm in different position and it was too large for a local rotation advancement flap as well. Therefore, the decision was made to do a partial complex layered closure over the most superior aspect of the wound to cover any bony acromion exposure. This was done with 3-0 Vicryl sutures for the deep dermal layer and 3-0 Prolene sutures for the skin. The area closed was 1.5 cm. The remaining area was measuring 4.5 x 5 cm and a 2 x 2-inch piece of bilayer Integra was used to cover the wound. This was trimmed to fit the defect and then secured with 4-0 chromic sutures circumferentially and a Xeroform bolster on top of it and secured with 2-0 silk sutures. Then, gauze and ABD, paper tapes were applied. The patient tolerated the procedure well and awoke from anesthesia without difficulty. All sponge and needle counts were correct at the end of the case. Dictated By Phyllis Myers.  Ann Martinez MD  d: 10/10/2023 21:24:01  t: 10/10/2023 22:01:14  Job [id number]  [facility]/

## 2023-10-11 NOTE — TELEPHONE ENCOUNTER
Pathology available and discussed with patient. 4.1 millimeters thick melanoma with 1 out of 5 positive lymph node. This is grossly involved. Will obtain PET scan and MRI of the brain. Orders placed in [facility]. To discuss at our upcoming multidisciplinary tumor board.

## 2023-10-12 RX ORDER — PREDNISOLONE ACETATE 10 MG/ML
1 SUSPENSION/ DROPS OPHTHALMIC 4 TIMES DAILY
COMMUNITY

## 2023-10-13 DIAGNOSIS — C43.61 MALIGNANT MELANOMA OF RIGHT SHOULDER (HCC): Primary | ICD-10-CM

## 2023-10-14 ENCOUNTER — PATIENT MESSAGE (OUTPATIENT)
Dept: SURGERY | Facility: CLINIC | Age: 68
End: 2023-10-14
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
## 2023-10-16 ENCOUNTER — OFFICE VISIT (OUTPATIENT)
Dept: SURGERY | Facility: CLINIC | Age: 68
End: 2023-10-16
Payer: MEDICARE

## 2023-10-16 DIAGNOSIS — C43.61 MALIGNANT MELANOMA OF RIGHT SHOULDER (HCC): Primary | ICD-10-CM

## 2023-10-16 PROCEDURE — 99024 POSTOP FOLLOW-UP VISIT: CPT

## 2023-10-16 PROCEDURE — 99024 POSTOP FOLLOW-UP VISIT: CPT | Performed by: PHYSICIAN ASSISTANT

## 2023-10-16 NOTE — PROGRESS NOTES
Jessie Miranda is a 79year old male who presents today for a follow-up after wide local excision of malignant melanoma of right shoulder and sentinel lymph node biopsy of right axilla by Dr. Tiara Zhang followed by immediate reconstruction with partial complex wound closure with Integra placement on 10/10/23 by Dr. Alonzo Herrera. He denies fever and chills. He denies nausea, vomiting, diarrhea or constipation. His pain is controlled. Physical Exam     Right shoulder Xeroform bolster removed. Integra adherent. No wound drainage or surrounding erythema. There were no vitals filed for this visit. Assessment and Plan     Jessie Miranda is doing well s/p wide local excision of malignant melanoma of right shoulder and sentinel lymph node biopsy of right axilla by Dr. Tiara Zhang followed by immediate reconstruction with partial complex wound closure with Integra placement on 10/10/23 by Dr. Alonzo Herrera. The bolster was removed today. Patient tolerated this well. The wound was dressed with neosporin, xeroform and telfa. Patient was instructed to change this daily. He should not get his wound wet. He is scheduled for second stage reconstruction on 10/31/2023. He will follow-up for preoperative visit with Dr. Alonzo Herrera next week. We reviewed reasons to contact our office. Questions were answered. Patient understands.      Rigoberto [place], [place]  10/16/2023

## 2023-10-23 ENCOUNTER — TELEPHONE (OUTPATIENT)
Dept: SURGERY | Facility: CLINIC | Age: 68
End: 2023-10-23

## 2023-10-23 ENCOUNTER — HOSPITAL ENCOUNTER (OUTPATIENT)
Dept: NUCLEAR MEDICINE | Facility: HOSPITAL | Age: 68
Discharge: HOME | End: 2023-10-23
Attending: SURGERY

## 2023-10-23 DIAGNOSIS — C43.61 MALIGNANT MELANOMA OF RIGHT SHOULDER (HCC): ICD-10-CM

## 2023-10-23 DIAGNOSIS — C77.9 MALIGNANT MELANOMA METASTATIC TO LYMPH NODE (HCC): ICD-10-CM

## 2023-10-23 DIAGNOSIS — C77.9 METASTATIC MELANOMA TO LYMPH NODE (HCC): ICD-10-CM

## 2023-10-23 LAB — GLUCOSE BLD-MCNC: 73 MG/DL (ref 70–99)

## 2023-10-23 PROCEDURE — 78816 PET IMAGE W/CT FULL BODY: CPT | Performed by: SURGERY

## 2023-10-23 PROCEDURE — 82962 GLUCOSE BLOOD TEST: CPT

## 2023-10-25 ENCOUNTER — OFFICE VISIT (OUTPATIENT)
Dept: SURGERY | Facility: CLINIC | Age: 68
End: 2023-10-25

## 2023-10-25 DIAGNOSIS — C43.61 MALIGNANT MELANOMA OF RIGHT SHOULDER (HCC): Primary | ICD-10-CM

## 2023-10-25 PROCEDURE — 99024 POSTOP FOLLOW-UP VISIT: CPT | Performed by: SURGERY

## 2023-10-25 NOTE — H&P (VIEW-ONLY)
Estabilshed Patient Consultation    Chief Complaint: right shoulder wound     History of Present Illness:   Myrna Scott is a 79year old male who returns to the office s/p wide local excision of malignant melanoma of right shoulder and sentinel lymph node biopsy of right axilla by Dr. Charlie Fierro followed by immediate reconstruction with partial complex wound closure with Integra placement on 10/10/23. His pain is controlled. He denies fever/chills. Past Medical History:      Past Medical History:   Diagnosis Date    Arthritis     Back    Back problem     Cancer ([address]     malignant melanoma right shoulder    Osteoarthritis     lower back    Visual impairment     glasses with progressive lenses         Past Surgical History:  Past Surgical History:   Procedure Laterality Date    OTHER SURGICAL HISTORY Right 10/10/2023    EXCISION MELANOMA WITH SENTINEL LYMPH NODE BIOPSY RIGHT SHOLDER    TONSILLECTOMY      10years old         Medications:    No outpatient medications have been marked as taking for the 10/25/23 encounter (Office Visit) with Raheem Dempsey MD.        Allergies:      Cat Hair Extract        OTHER (SEE COMMENTS)    Comment:Itchy eyes, running nose and congestion      Family History:   Family History   Problem Relation Age of Onset    Skin cancer Mother         Non Melanoma    Other (Thymus Cancer) Father     Skin cancer Maternal Grandmother         Non Melanoma    Other (Lung cancer) Maternal Grandfather          Social History:    Alcohol use   Yes   6.0 standard drinks of alcohol/week   3 Glasses of wine, 3 Cans of beer per week      Comment:   3 DRINKS/WEEK            Smoking status:   Never      Smokeless tobacco:   Never         Drug use:         Types:   Cannabis         Review of Systems:    The patient reports: see HPI  All other systems are unremarkable. Physical Exam:    There were no vitals taken for this visit.     Constitutional: The patient is an alert, oriented and well-developed. Neurologic: Speech patterns and movements are normal.     Psychiatric: Affect is appropriate. Eyes: Conjunctiva are clear, non-icteric. PERRL    ENT: no obvious abnormality, no ear drainage, mucous membranes moist and pink    Integument/Skin: right shoulder integra adherent. No wound drainage or surrounding erythema. Respiratory: Normal respiratory effort. Cardiovascular: no cyanosis, no edema    Musculoskeletal: see skin exam    Impression:   Christopher Rojas  is a 79year old s/p wide local excision of malignant melanoma of right shoulder and sentinel lymph node biopsy of right axilla by Dr. Zain Gotti followed by immediate reconstruction with partial complex wound closure with Integra placement on 10/10/23     Discussion and Plan:  The patient was counseled on the different treatment options. We reviewed the plan for second stage right shoulder reconstruction with skin graft. We will take the graft from the right groin. We discussed the procedure and expected postoperative course in detail today. He is scheduled for this on 10/31/2023. He will continue with daily dressing changes in the meantime and will continue to not get the wound wet. The different treatment options were discussed with the patient. The procedures and the postoperative care was discussed in detail. Potential risks complications benefits and alternatives were discussed. Risks and complications including but not limited to infection, bleeding, scarring, damage to surrounding structures, such as nerves, blood vessels, muscles,  tendons, risk of hematoma, seroma, foreign body reaction, allergic reaction, wound dehiscences, delayed wound healing, graft failure, need for further surgery. Patient understands and wishes to proceed with the procedure, questions were answered. 25 minutes were spent with the patient, from which 20 minutes were spent counseling the patient and coordinating care.

## 2023-10-25 NOTE — CONSULTS
Estabilshed Patient Consultation    Chief Complaint: right shoulder wound     History of Present Illness:   Elizabeth Mcdaniel is a 79year old male who returns to the office s/p wide local excision of malignant melanoma of right shoulder and sentinel lymph node biopsy of right axilla by Dr. Taylor Gaytan followed by immediate reconstruction with partial complex wound closure with Integra placement on 10/10/23. His pain is controlled. He denies fever/chills. Past Medical History:      Past Medical History:   Diagnosis Date    Arthritis     Back    Back problem     Cancer ([address]     malignant melanoma right shoulder    Osteoarthritis     lower back    Visual impairment     glasses with progressive lenses         Past Surgical History:  Past Surgical History:   Procedure Laterality Date    OTHER SURGICAL HISTORY Right 10/10/2023    EXCISION MELANOMA WITH SENTINEL LYMPH NODE BIOPSY RIGHT SHOLDER    TONSILLECTOMY      10years old         Medications:    No outpatient medications have been marked as taking for the 10/25/23 encounter (Office Visit) with Chelita Roberson MD.        Allergies:      Cat Hair Extract        OTHER (SEE COMMENTS)    Comment:Itchy eyes, running nose and congestion      Family History:   Family History   Problem Relation Age of Onset    Skin cancer Mother         Non Melanoma    Other (Thymus Cancer) Father     Skin cancer Maternal Grandmother         Non Melanoma    Other (Lung cancer) Maternal Grandfather          Social History:    Alcohol use   Yes   6.0 standard drinks of alcohol/week   3 Glasses of wine, 3 Cans of beer per week      Comment:   3 DRINKS/WEEK            Smoking status:   Never      Smokeless tobacco:   Never         Drug use:         Types:   Cannabis         Review of Systems:    The patient reports: see HPI  All other systems are unremarkable. Physical Exam:    There were no vitals taken for this visit.     Constitutional: The patient is an alert, oriented and well-developed. Neurologic: Speech patterns and movements are normal.     Psychiatric: Affect is appropriate. Eyes: Conjunctiva are clear, non-icteric. PERRL    ENT: no obvious abnormality, no ear drainage, mucous membranes moist and pink    Integument/Skin: right shoulder integra adherent. No wound drainage or surrounding erythema. Respiratory: Normal respiratory effort. Cardiovascular: no cyanosis, no edema    Musculoskeletal: see skin exam    Impression:   Rod Christiansen  is a 79year old s/p wide local excision of malignant melanoma of right shoulder and sentinel lymph node biopsy of right axilla by Dr. Cipriano Hayes followed by immediate reconstruction with partial complex wound closure with Integra placement on 10/10/23     Discussion and Plan:  The patient was counseled on the different treatment options. We reviewed the plan for second stage right shoulder reconstruction with skin graft. We will take the graft from the right groin. We discussed the procedure and expected postoperative course in detail today. He is scheduled for this on 10/31/2023. He will continue with daily dressing changes in the meantime and will continue to not get the wound wet. The different treatment options were discussed with the patient. The procedures and the postoperative care was discussed in detail. Potential risks complications benefits and alternatives were discussed. Risks and complications including but not limited to infection, bleeding, scarring, damage to surrounding structures, such as nerves, blood vessels, muscles,  tendons, risk of hematoma, seroma, foreign body reaction, allergic reaction, wound dehiscences, delayed wound healing, graft failure, need for further surgery. Patient understands and wishes to proceed with the procedure, questions were answered. 25 minutes were spent with the patient, from which 20 minutes were spent counseling the patient and coordinating care.

## 2023-10-25 NOTE — PROGRESS NOTES
Patient is here for a follow-up visit. We had considered his lymph node as clinically involved and thus I had discussed total lymph node dissection. He had seen oncology as for and defining the node as clinical positive versus sentinel lymph node positive was discussed with him. Pros and cons of undergoing surgery with adjuvant immunotherapy versus immunotherapy alone discussed. Implications discussed. 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  Patient agreed and understood. He had ample time to ask questions. Ultrasound axilla order placed in epic.

## 2023-10-31 ENCOUNTER — ANESTHESIA (OUTPATIENT)
Dept: SURGERY | Facility: HOSPITAL | Age: 68
End: 2023-10-31
Payer: MEDICARE

## 2023-10-31 ENCOUNTER — ANESTHESIA EVENT (OUTPATIENT)
Dept: SURGERY | Facility: HOSPITAL | Age: 68
End: 2023-10-31
Payer: MEDICARE

## 2023-10-31 ENCOUNTER — HOSPITAL ENCOUNTER (OUTPATIENT)
Facility: HOSPITAL | Age: 68
Setting detail: HOSPITAL OUTPATIENT SURGERY
Discharge: HOME | End: 2023-10-31
Attending: SURGERY | Admitting: SURGERY
Payer: MEDICARE

## 2023-10-31 VITALS
BODY MASS INDEX: 19.06 KG/M2 | OXYGEN SATURATION: 100 % | TEMPERATURE: 97 F | WEIGHT: 114.38 LBS | HEART RATE: 52 BPM | SYSTOLIC BLOOD PRESSURE: 168 MMHG | RESPIRATION RATE: 18 BRPM | HEIGHT: 65 IN | DIASTOLIC BLOOD PRESSURE: 95 MMHG

## 2023-10-31 DIAGNOSIS — C43.61 MALIGNANT MELANOMA OF RIGHT SHOULDER (HCC): ICD-10-CM

## 2023-10-31 PROCEDURE — 0HB7XZZ EXCISION OF ABDOMEN SKIN, EXTERNAL APPROACH: ICD-10-PCS | Performed by: SURGERY

## 2023-10-31 PROCEDURE — 0HRBX73 REPLACEMENT OF RIGHT UPPER ARM SKIN WITH AUTOLOGOUS TISSUE SUBSTITUTE, FULL THICKNESS, EXTERNAL APPROACH: ICD-10-PCS | Performed by: SURGERY

## 2023-10-31 RX ORDER — ACETAMINOPHEN 500 MG
1000 TABLET ORAL ONCE
Status: DISCONTINUED | OUTPATIENT
Start: 2023-10-31 | End: 2023-10-31 | Stop reason: HOSPADM

## 2023-10-31 RX ORDER — SODIUM CHLORIDE, SODIUM LACTATE, POTASSIUM CHLORIDE, CALCIUM CHLORIDE 600; 310; 30; 20 MG/100ML; MG/100ML; MG/100ML; MG/100ML
INJECTION, SOLUTION INTRAVENOUS CONTINUOUS
Status: DISCONTINUED | OUTPATIENT
Start: 2023-10-31 | End: 2023-10-31

## 2023-10-31 RX ORDER — HYDROMORPHONE HYDROCHLORIDE 1 MG/ML
INJECTION, SOLUTION INTRAMUSCULAR; INTRAVENOUS; SUBCUTANEOUS
Status: COMPLETED
Start: 2023-10-31 | End: 2023-10-31

## 2023-10-31 RX ORDER — TRAMADOL HYDROCHLORIDE 50 MG/1
50 TABLET ORAL EVERY 6 HOURS PRN
Qty: 20 TABLET | Refills: 0 | Status: SHIPPED | OUTPATIENT
Start: 2023-10-31

## 2023-10-31 RX ORDER — METOCLOPRAMIDE HYDROCHLORIDE 5 MG/ML
10 INJECTION INTRAMUSCULAR; INTRAVENOUS EVERY 8 HOURS PRN
Status: DISCONTINUED | OUTPATIENT
Start: 2023-10-31 | End: 2023-10-31

## 2023-10-31 RX ORDER — HYDROMORPHONE HYDROCHLORIDE 1 MG/ML
0.4 INJECTION, SOLUTION INTRAMUSCULAR; INTRAVENOUS; SUBCUTANEOUS EVERY 5 MIN PRN
Status: DISCONTINUED | OUTPATIENT
Start: 2023-10-31 | End: 2023-10-31

## 2023-10-31 RX ORDER — HYDROMORPHONE HYDROCHLORIDE 1 MG/ML
0.6 INJECTION, SOLUTION INTRAMUSCULAR; INTRAVENOUS; SUBCUTANEOUS EVERY 5 MIN PRN
Status: DISCONTINUED | OUTPATIENT
Start: 2023-10-31 | End: 2023-10-31

## 2023-10-31 RX ORDER — LIDOCAINE HYDROCHLORIDE 10 MG/ML
INJECTION, SOLUTION EPIDURAL; INFILTRATION; INTRACAUDAL; PERINEURAL AS NEEDED
Status: DISCONTINUED | OUTPATIENT
Start: 2023-10-31 | End: 2023-10-31 | Stop reason: SURG

## 2023-10-31 RX ORDER — BUPIVACAINE HYDROCHLORIDE 5 MG/ML
INJECTION, SOLUTION EPIDURAL; INTRACAUDAL AS NEEDED
Status: DISCONTINUED | OUTPATIENT
Start: 2023-10-31 | End: 2023-10-31 | Stop reason: HOSPADM

## 2023-10-31 RX ORDER — DEXAMETHASONE SODIUM PHOSPHATE 4 MG/ML
VIAL (ML) INJECTION AS NEEDED
Status: DISCONTINUED | OUTPATIENT
Start: 2023-10-31 | End: 2023-10-31 | Stop reason: SURG

## 2023-10-31 RX ORDER — HYDROMORPHONE HYDROCHLORIDE 1 MG/ML
0.2 INJECTION, SOLUTION INTRAMUSCULAR; INTRAVENOUS; SUBCUTANEOUS EVERY 5 MIN PRN
Status: DISCONTINUED | OUTPATIENT
Start: 2023-10-31 | End: 2023-10-31

## 2023-10-31 RX ORDER — ONDANSETRON 2 MG/ML
4 INJECTION INTRAMUSCULAR; INTRAVENOUS EVERY 6 HOURS PRN
Status: DISCONTINUED | OUTPATIENT
Start: 2023-10-31 | End: 2023-10-31

## 2023-10-31 RX ORDER — NALOXONE HYDROCHLORIDE 0.4 MG/ML
0.08 INJECTION, SOLUTION INTRAMUSCULAR; INTRAVENOUS; SUBCUTANEOUS AS NEEDED
Status: DISCONTINUED | OUTPATIENT
Start: 2023-10-31 | End: 2023-10-31

## 2023-10-31 RX ORDER — MIDAZOLAM HYDROCHLORIDE 1 MG/ML
1 INJECTION INTRAMUSCULAR; INTRAVENOUS EVERY 5 MIN PRN
Status: DISCONTINUED | OUTPATIENT
Start: 2023-10-31 | End: 2023-10-31

## 2023-10-31 RX ORDER — MEPERIDINE HYDROCHLORIDE 25 MG/ML
12.5 INJECTION INTRAMUSCULAR; INTRAVENOUS; SUBCUTANEOUS AS NEEDED
Status: DISCONTINUED | OUTPATIENT
Start: 2023-10-31 | End: 2023-10-31

## 2023-10-31 RX ORDER — CEFAZOLIN SODIUM/WATER 2 G/20 ML
2 SYRINGE (ML) INTRAVENOUS ONCE
Status: DISCONTINUED | OUTPATIENT
Start: 2023-10-31 | End: 2023-10-31 | Stop reason: HOSPADM

## 2023-10-31 RX ORDER — PHENYLEPHRINE HCL 10 MG/ML
VIAL (ML) INJECTION AS NEEDED
Status: DISCONTINUED | OUTPATIENT
Start: 2023-10-31 | End: 2023-10-31 | Stop reason: SURG

## 2023-10-31 RX ORDER — ONDANSETRON 2 MG/ML
INJECTION INTRAMUSCULAR; INTRAVENOUS AS NEEDED
Status: DISCONTINUED | OUTPATIENT
Start: 2023-10-31 | End: 2023-10-31 | Stop reason: SURG

## 2023-10-31 RX ORDER — ONDANSETRON 4 MG/1
4 TABLET, FILM COATED ORAL EVERY 8 HOURS PRN
Qty: 15 TABLET | Refills: 0 | Status: SHIPPED | OUTPATIENT
Start: 2023-10-31

## 2023-10-31 RX ADMIN — LIDOCAINE HYDROCHLORIDE 50 MG: 10 INJECTION, SOLUTION EPIDURAL; INFILTRATION; INTRACAUDAL; PERINEURAL at 15:39:00

## 2023-10-31 RX ADMIN — PHENYLEPHRINE HCL 100 MCG: 10 MG/ML VIAL (ML) INJECTION at 15:44:00

## 2023-10-31 RX ADMIN — SODIUM CHLORIDE, SODIUM LACTATE, POTASSIUM CHLORIDE, CALCIUM CHLORIDE: 600; 310; 30; 20 INJECTION, SOLUTION INTRAVENOUS at 16:36:00

## 2023-10-31 RX ADMIN — PHENYLEPHRINE HCL 100 MCG: 10 MG/ML VIAL (ML) INJECTION at 16:13:00

## 2023-10-31 RX ADMIN — PHENYLEPHRINE HCL 100 MCG: 10 MG/ML VIAL (ML) INJECTION at 16:04:00

## 2023-10-31 RX ADMIN — ONDANSETRON 4 MG: 2 INJECTION INTRAMUSCULAR; INTRAVENOUS at 16:13:00

## 2023-10-31 RX ADMIN — DEXAMETHASONE SODIUM PHOSPHATE 8 MG: 4 MG/ML VIAL (ML) INJECTION at 15:54:00

## 2023-10-31 NOTE — BRIEF OP NOTE
Pre-Operative Diagnosis: Malignant melanoma of right shoulder ([facility] [address] [C43.61]     Post-Operative Diagnosis: Malignant melanoma of right shoulder ([facility] [address] [C43.61]      Procedure Performed:   Right shoulder reconstruction with skin graft    Surgeon(s) and Role:     Melida Ham MD - Primary    Assistant(s):  PA: ELISHA Junior     Surgical Findings:      Specimen:      Estimated Blood Loss: Blood Output: 5 mL (10/31/2023  4:24 PM)      Dictation Number:      Lexy Moy MD  10/31/2023  4:33 PM

## 2023-10-31 NOTE — DISCHARGE INSTRUCTIONS
Plastic Surgery Home Care Instructions      We hope you were pleased with your care at [facility]. We wish you the best outcome and overall experience with your operation. These instructions will help to minimize pain, optimize healing, and improve the likelihood of a successful result. What To Expect  There may be some spotting of the incision lines for the next few days. Mild bruising, mild swelling and discomfort in the surgical area is typical.     Bandages (Dressing)  Keep surgical bandage over shoulder in place for 2 days if tolerated. After that, you can remove the paper tape and gauze. Leave yellow gauze that is sutured in place. Apply mupirocin ointment daily around the yellow gauze after you remove the surgical bandage. The hospital will give you a tube of mupirocin ointment and a refill was sent to your pharmacy. Leave steri-strips in place on groin. If these fall off, that is ok. Bathing/Showers  DO NOT get right shoulder wet  You can let soap and water run over the right groin incision. Don't scrub  No baths, swimming, or hot tubs until you receive medical permission    Pain Medication: Tramadol  Take one tablet every six hours as needed for pain. Do not take narcotics, if you do not have pain. Keep stools soft. Take a stool softener (Metamucil, Milk of Magnesia, Colace).   Eating fruit will also help to prevent constipation    Over-The-Counter Medication  You can take Tylenol after surgery for pain relief as needed  You can take Aleve or ibuprofen starting 24 hours after surgery  Take as directed on the bottle    Home Medication  Resume your home medications as instructed  Do not resume herbal medications for two weeks    Diet  Resume your normal diet    Activity  No strenuous activity   No lifting with your right arm  You can use the sling for comfort  You cannot return to physical exercise, sports, or gym workouts until you are allowed to participate in strenuous activity. Driving  Do not drive, if you are taking pain medication. Return to Work or [employer] can return to work when you are not taking pain medication, if your work does not involve strenuous activity. Contact the office, if you need a medical note. Follow-up Appointment  Our office will call you to set up a time    Francoise Betancourt   Thank you for coming to [facility] for your operation. The nurses and the anesthesiologist try very hard to make sure you receive the best care possible. Your trust in them is greatly appreciated.     Thanks so much,  Dr. Bob Wilson PA-C

## 2023-10-31 NOTE — ANESTHESIA PROCEDURE NOTES
Airway  Date/Time: 10/31/2023 3:46 PM  Urgency: elective      General Information and Staff    Patient location during procedure: OR  Anesthesiologist: Karthik Mishra MD  Performed: anesthesiologist   Performed by: Karthik Mishra MD  Authorized by: Karthik Mishra MD      Indications and Patient Condition  Indications for airway management: anesthesia  Sedation level: deep  Preoxygenated: yes  Patient position: sniffing  Mask difficulty assessment: 1 - vent by mask  Planned trial extubation    Final Airway Details  Final airway type: supraglottic airway      Successful airway: classic  Size 3       Number of attempts at approach: 1

## 2023-11-01 NOTE — OPERATIVE REPORT
[facility]    PATIENT'S NAME: Kunal Silvestre   ATTENDING PHYSICIAN: Lexy Beavers MD   OPERATING PHYSICIAN: Lexy Beavers MD   PATIENT ACCOUNT#:   [id number]    LOCATION:  [address] [place] [address]  MEDICAL RECORD #:   [id number]       YOB: 1955  ADMISSION DATE:       10/31/2023      OPERATION DATE:  10/31/2023    OPERATIVE REPORT     #####EDITING MAY BE REQUIRED - POOR AUDIO QUALITY#####    PREOPERATIVE DIAGNOSIS:  Open wound, right shoulder. POSTOPERATIVE DIAGNOSIS:  Open wound, right shoulder. PROCEDURE:    1. Tangential excision and preparation of wound bed, right shoulder, 30 sq cm. 2.   Full-thickness skin graft from right groin to right shoulder, 30 sq cm. ASSISTANT:  Kuldeep Danielson PA-C. ANESTHESIA:  General anesthesia. COMPLICATIONS:  None. BLOOD LOSS:  Minimal.    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 He was seen in the office preoperatively. Potential risks, complications, benefits, and alternatives were discussed. Risks and complications include, but are not limited to, infection, bleeding, scarring, damage to surrounding structures, graft failure, scar contracture, functional impairment of the right arm and shoulder, need for further surgery. The patient understands and wishes to proceed. Questions were answered. OPERATIVE TECHNIQUE:  The patient was identified in the preoperative area, informed consent was obtained, the site was marked. Patient was then brought back to the operating room, placed in supine position. He was adequately padded. Sequential compression devices were applied. General anesthesia was administered. Perioperative antibiotics were given.   Then his entire right arm and shoulder and right groin were prepped and draped in the usual sterile fashion. The procedure was started with infiltration of local anesthesia; 0.5% Marcaine was injected surrounding the right groin and the right shoulder. A total of 10 mL was used. Then, a 10 blade was used, and tangential excision of the wound bed was performed of the right shoulder. This was done until healthy punctate bleeding was seen on the wound bed. Then a fresh 10 blade was used to make the incision for the skin graft harvest.  This was measuring 6 x 5 cm. This was then harvested from the right groin and defatted, and small drainage holes were placed using a 15 blade. Then, the skin graft was secured and sutured to the open wound with 4-0 chromic sutures circumferentially and in the center.  ________ was closed with baseball running sutures and running U sutures. Steri-Strips were applied. ABD and paper tape were applied to the right shoulder. The patient tolerated the procedure well and awoke from anesthesia without difficulty. All sponge and needle counts were correct at the end of the case. Dictated By Ragini Guerrero.  Ignacia Bernabe MD  d: 10/31/2023 17:18:47  t: 10/31/2023 18:58:22  Job [id number]  [facility]/

## 2023-11-06 ENCOUNTER — OFFICE VISIT (OUTPATIENT)
Dept: SURGERY | Facility: CLINIC | Age: 68
End: 2023-11-06
Payer: MEDICARE

## 2023-11-06 DIAGNOSIS — C43.61 MALIGNANT MELANOMA OF RIGHT SHOULDER (HCC): Primary | ICD-10-CM

## 2023-11-06 NOTE — PROGRESS NOTES
Naomi Meraz is a 79year old male who presents today for a follow-up after tangential excision and preparation of wound bed, right shoulder, 30 sq cm., full-thickness skin graft from right groin to right shoulder, 30 sq cm. (Dr. Kyle Holm) on 10/31/2023. He denies fever and chills. He denies nausea, vomiting, diarrhea or constipation. His pain is controlled. Physical Exam     Right Shoulder: Xeroform bolster in place. Graft adherent. No evidence of hematoma or seroma. There is diffuse erythema around the graft site consistent with a allergy to Neosporin. No evidence of open wound or wound drainage. Right Groin: Surgical incisions are clean dry and intact. No evidence of hematoma or seroma. Sutures in place. There were no vitals filed for this visit. Assessment and Plan     Naomi Meraz is doing well s/p tangential excision and preparation of wound bed, right shoulder, 30 sq cm., full-thickness skin graft from right groin to right shoulder, 30 sq cm. (Dr. Kyle Holm) on 10/31/2023. Patient is here for wound check and bolster removal.  The right shoulder bolster was removed today. The patient tolerated this well. The graft was redressed in Vaseline, Xeroform, Telfa, ABD, tape. The patient will change this dressing to include mupirocin ointment instead of Vaseline. He will change this dressing every other day. Supplies were given. The right groin incision was redressed with Vaseline, Xeroform, Telfa, tape. The patient will change this dressing every other day. Patient was instructed to not get graft site wet. He will follow-up with Dr. Kyle Holm on 11- for wound check. Was encouraged to contact the office with any questions or concerns. Compression and activity guidelines were reviewed with the patient  Patient reports intermittent discomfort in right upper extremity consistent with lymphedema.   An order for physical therapy for evaluation of lymphedema was given to the patient. Questions were answered. Patient understands.      RADHA Gan  11/6/2023  12:01 PM

## 2023-11-08 ENCOUNTER — OFFICE VISIT (OUTPATIENT)
Dept: HEMATOLOGY/ONCOLOGY | Facility: HOSPITAL | Age: 68
End: 2023-11-08
Attending: INTERNAL MEDICINE
Payer: MEDICARE

## 2023-11-08 VITALS
WEIGHT: 116.63 LBS | OXYGEN SATURATION: 98 % | BODY MASS INDEX: 19 KG/M2 | TEMPERATURE: 98 F | DIASTOLIC BLOOD PRESSURE: 91 MMHG | SYSTOLIC BLOOD PRESSURE: 145 MMHG | HEART RATE: 61 BPM

## 2023-11-08 DIAGNOSIS — C43.61 MALIGNANT MELANOMA OF RIGHT SHOULDER (HCC): Primary | ICD-10-CM

## 2023-11-08 PROCEDURE — 99205 OFFICE O/P NEW HI 60 MIN: CPT | Performed by: INTERNAL MEDICINE

## 2023-11-08 NOTE — PROGRESS NOTES
Outpatient Oncology Care Plan   Problem list:   fatigue   Problems related to:   disease/disease progression   Interventions:   provided general teaching   Expected outcomes:   understands plan of care   Progress towards outcome: making progress     Education Record   Learner: Patient   Barriers / Limitations: None   Method: Discussion   Outcome: Shows understanding   Comments:  Patient here as a new consult. Energy levels are stable. Denies any fevers, nausea, night sweats, or worsening swelling.  Established with [facility] oncologist.

## 2023-11-09 ENCOUNTER — HOSPITAL ENCOUNTER (OUTPATIENT)
Dept: MRI IMAGING | Facility: HOSPITAL | Age: 68
Discharge: HOME | End: 2023-11-09
Attending: SURGERY
Payer: MEDICARE

## 2023-11-09 DIAGNOSIS — C77.9 METASTATIC MELANOMA TO LYMPH NODE (HCC): ICD-10-CM

## 2023-11-09 PROCEDURE — A9575 INJ GADOTERATE MEGLUMI 0.1ML: HCPCS | Performed by: SURGERY

## 2023-11-09 PROCEDURE — 70553 MRI BRAIN STEM W/O & W/DYE: CPT | Performed by: SURGERY

## 2023-11-09 RX ORDER — GADOTERATE MEGLUMINE 376.9 MG/ML
15 INJECTION INTRAVENOUS
Status: COMPLETED | OUTPATIENT
Start: 2023-11-09 | End: 2023-11-09

## 2023-11-09 RX ADMIN — GADOTERATE MEGLUMINE 15 ML: 376.9 INJECTION INTRAVENOUS at 14:01:00

## 2023-11-15 ENCOUNTER — OFFICE VISIT (OUTPATIENT)
Dept: SURGERY | Facility: CLINIC | Age: 68
End: 2023-11-15
Payer: MEDICARE

## 2023-11-15 DIAGNOSIS — C43.61 MALIGNANT MELANOMA OF RIGHT SHOULDER (HCC): Primary | ICD-10-CM

## 2023-11-15 PROCEDURE — 99024 POSTOP FOLLOW-UP VISIT: CPT | Performed by: SURGERY

## 2023-11-15 NOTE — PROGRESS NOTES
Maureen Pool is a 79year old male who presents today for a follow-up after tangential excision and preparation of wound bed, right shoulder, 30 sq cm., full-thickness skin graft from right groin to right shoulder, 30 sq cm. on 10/31/2023. He previously underwent wide local excision of malignant melanoma of right shoulder and sentinel lymph node biopsy of right axilla by Dr. Feng Dumont followed by immediate reconstruction with partial complex wound closure with Integra placement on 10/10/23. He denies fever and chills. He denies nausea, vomiting, diarrhea or constipation. His pain is controlled. Physical Exam     Right shoulder skin graft with complete take. No wound drainage or erythema. There is slight skin irritation surrounding the graft likely reactionary to the chromic sutures. Right groin incision with Prolene sutures in place, clean, dry and intact. No wound drainage. There were no vitals filed for this visit. Assessment and Plan     Maureen Pool is doing well s/p tangential excision and preparation of wound bed, right shoulder, 30 sq cm., full-thickness skin graft from right groin to right shoulder, 30 sq cm. on 10/31/2023. He previously underwent wide local excision of malignant melanoma of right shoulder and sentinel lymph node biopsy of right axilla by Dr. Feng Dumont followed by immediate reconstruction with partial complex wound closure with Integra placement on 10/10/23. The remaining chromic sutures were removed from the skin graft. He can apply vitamin E oil, coconut oil, Vaseline or Aquaphor to this daily. The right groin sutures were removed today. There was a small less than 1 mm superficial wound separation. New Steri-Strips were placed and skin splints fashion for support. He can keep these in place for the next week. If he develops any redness or wound drainage, he should take off the Steri-Strips.   After a week, if he still has superficial wound separation I recommend he apply antibiotic ointment daily until this completely heals. He can then begin scar management. He will follow-up in 3 to 4 months or sooner if he has any questions or concerns. He should continue to follow with Dr. Froylan Herring as recommended. Questions were answered. Patient understands.

## 2023-11-18 ENCOUNTER — PATIENT MESSAGE (OUTPATIENT)
Dept: SURGERY | Facility: CLINIC | Age: 68
End: 2023-11-18

## 2023-11-20 ENCOUNTER — TELEPHONE (OUTPATIENT)
Dept: SURGERY | Facility: CLINIC | Age: 68
End: 2023-11-20

## 2023-11-20 NOTE — TELEPHONE ENCOUNTER
From: Damaris Lux  To: Lexy Mckenna  Sent: 11/18/2023 8:36 PM CST  Subject: Skin graft Kendal Mckenna, I'm enclosing two photos of my skin graft, it doesn't look great and I wanted you to have a look. The areas of concern appear to be raised. I've circled these areas. Also, some areas look to be peeling slightly. It seemed worse after a shower. I did not soak it during the shower, just minimally wet. I have not started the silicone sheets, but they are ordered and will arrive soon. Any advice would be appreciated. I also want you to know how much I appreciate your skill and expertise. Thank you!   TD

## 2023-11-20 NOTE — TELEPHONE ENCOUNTER
Pt called regarding his MyChart message and photos sent in. States he received silicone scar strips in and has been using them over the weekend and the area looks better than in the photos he sent. Requested he send a new set of photos in tomorrow to be reviewed by PA to evaluate. Jennifer Olmstead is agreeable to this plan. Agrees to call into the  office or send MyChart if any further wound care issues between now and then. Denies having any questions at this time.

## 2023-11-20 NOTE — TELEPHONE ENCOUNTER
----- Message from Renee Enriquez sent at 11/18/2023  8:36 PM CST -----  Regarding: Skin graft weirdness  Contact: [phone]  Charisse Donaldson, I'm enclosing two photos of my skin graft, it doesn't look great and I wanted you to have a look. The areas of concern appear to be raised. I've circled these areas. Also, some areas look to be peeling slightly. It seemed worse after a shower. I did not soak it during the shower, just minimally wet. I have not started the silicone sheets, but they are ordered and will arrive soon. Any advice would be appreciated. I also want you to know how much I appreciate your skill and expertise. Thank you!   TD

## 2024-01-23 DIAGNOSIS — C77.9 MALIGNANT MELANOMA METASTATIC TO LYMPH NODE (HCC): ICD-10-CM

## 2024-01-23 DIAGNOSIS — C43.61 MALIGNANT MELANOMA OF RIGHT SHOULDER (HCC): Primary | ICD-10-CM

## 2024-01-23 DIAGNOSIS — C77.9 METASTATIC MELANOMA TO LYMPH NODE (HCC): ICD-10-CM

## 2024-01-23 DIAGNOSIS — C77.3 SECONDARY AND UNSPECIFIED MALIGNANT NEOPLASM OF AXILLA AND UPPER LIMB LYMPH NODES (HCC): ICD-10-CM

## 2024-02-12 ENCOUNTER — HOSPITAL ENCOUNTER (OUTPATIENT)
Dept: ULTRASOUND IMAGING | Facility: HOSPITAL | Age: 69
Discharge: HOME | End: 2024-02-12
Attending: SURGERY
Payer: MEDICARE

## 2024-02-12 DIAGNOSIS — C77.9 MALIGNANT MELANOMA METASTATIC TO LYMPH NODE (HCC): ICD-10-CM

## 2024-02-12 DIAGNOSIS — C77.3 SECONDARY AND UNSPECIFIED MALIGNANT NEOPLASM OF AXILLA AND UPPER LIMB LYMPH NODES (HCC): ICD-10-CM

## 2024-02-12 DIAGNOSIS — C77.9 METASTATIC MELANOMA TO LYMPH NODE (HCC): ICD-10-CM

## 2024-02-12 DIAGNOSIS — C43.61 MALIGNANT MELANOMA OF RIGHT SHOULDER (HCC): ICD-10-CM

## 2024-02-12 PROCEDURE — 76882 US LMTD JT/FCL EVL NVASC XTR: CPT

## 2024-02-23 ENCOUNTER — TELEPHONE (OUTPATIENT)
Dept: SURGERY | Facility: CLINIC | Age: 69
End: 2024-02-23

## 2024-02-23 DIAGNOSIS — C77.3 SECONDARY AND UNSPECIFIED MALIGNANT NEOPLASM OF AXILLA AND UPPER LIMB LYMPH NODES (HCC): ICD-10-CM

## 2024-02-23 DIAGNOSIS — C77.9 MALIGNANT MELANOMA METASTATIC TO LYMPH NODE (HCC): ICD-10-CM

## 2024-02-23 DIAGNOSIS — C77.9 METASTATIC MELANOMA TO LYMPH NODE (HCC): ICD-10-CM

## 2024-02-23 DIAGNOSIS — C43.61 MALIGNANT MELANOMA OF RIGHT SHOULDER (HCC): Primary | ICD-10-CM

## 2024-02-23 NOTE — TELEPHONE ENCOUNTER
Called patient in regard to US results. Discussed with Dr Ortiz Medical Oncology. Agreed that US can be used as baseline and continue with them every 4 months. Per Dr Ortiz, patient's LDH has been normal and PET scan does not need to be moved up. Discussed this with patient. Scheduled him for skin check appointment.     ELISHA Espinoza

## 2024-03-11 NOTE — PROGRESS NOTES
[facility]        Patient Name:  George Li   YOB: 1955   Gender:  Male   Appt Date:  3/13/2024   Provider:  Jakub Van MD     PATIENT PROVIDERS  Primary Care Provider:Yesi Singleton MD   Address: [address] [place] [address]   Phone #: [phone]       CHIEF COMPLAINT  Chief Complaint   Patient presents with    Follow - Up        PROBLEMS  Reviewed   Patient Active Problem List   Diagnosis    Malignant melanoma of right shoulder (HCC)    Inguinal hernia of left side without obstruction or gangrene    Elevated blood pressure reading        History of Present Illness:  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 He states he has had this mole his entire life. He first noticed it changing 1 year ago (getting slightly wider, changing colors). In July he scratched the lesion and it began bleeding. He went to see Dr. Martinez ([facility]) who biopsied the lesion which showed malignant melanoma 1.7mm thickness non [id number]. He was referred to surgical oncology there with the intention of being placed in a clinical trial which fell through. Patient attempted to follow up but states no one would return his calls or schedule him.   He was seen for treatment regarding the same lesion. States the initial lesion was flat and since the biopsy it has progressed to become more red, nodular and secretes clear drainage. Denies fever, chills or purulent discharge. Of note, was recently seen by Dr. Ortiz (heme/onc at [facility]) on 9/12 who agreed with the original plan of wide local excision with SLNB and did not recommend any tests or imaging prior to surgery. Denies weight loss, changes in appetite, or night sweats. Recalls one significant sun burn at age 10 and a few minor sunburns after that. Denies sunscreen use.      10/10/2023: Wide  excision melanoma right upper arm with sentinel lymph node biopsy --> Invasive melanoma with ulceration, 4.1 mm, margins negative, metastatic melanoma involving 1/5 lymph nodes     Interval History:  10/23/2023 - PET scan without metastatic disease  11/9/2023 - Brain MRI without metastatic disease  11/30/2023 - Started Pembro with Dr Wild Ortiz at [facility]    He has no new lesions or areas he is concerning about. He has no problems with incision sites. His last immunotherapy was 3 weeks ago, he is scheduled for tomorrow. He does have some swelling in the hand and wears a compression glove.      Vital Signs:  /90 (BP Location: Left arm, Patient Position: Sitting, Cuff Size: adult)   Pulse 64   Temp 97.7 °F (36.5 °C) (Temporal)   Resp 20   Wt 52.3 kg (115 lb 6.4 oz)   BMI 19.20 kg/m²      Medications Reviewed:    Current Outpatient Medications:     mesalamine 1.2 g Oral Tab EC, Take 1 tablet (1.2 g total) by mouth 2 (two) times daily. Pt states only taking one a day, Disp: , Rfl:     PEMBROLIZUMAB IV, , Disp: , Rfl:     traMADol 50 MG Oral Tab, Take 1 tablet (50 mg total) by mouth every 6 (six) hours as needed for Pain. (Patient not taking: Reported on 11/8/2023), Disp: 20 tablet, Rfl: 0    mupirocin 2 % External Ointment, Apply 1 Application topically daily., Disp: 22 g, Rfl: 2    prednisoLONE 1 % Ophthalmic Suspension, Place 1 drop into the left eye 4 (four) times daily., Disp: , Rfl:      Allergies Reviewed:  Allergies   Allergen Reactions    Cat Hair Extract OTHER (SEE COMMENTS)     Itchy eyes, running nose and congestion        History:  Reviewed:  Past Medical History:   Diagnosis Date    Arthritis     Back    Back problem     Cancer (HCC)     malignant melanoma right shoulder    Osteoarthritis     lower back    Visual impairment     glasses with progressive lenses      Reviewed:  Past Surgical History:   Procedure Laterality Date    Other surgical history Right 10/10/2023     EXCISION MELANOMA WITH SENTINEL LYMPH NODE BIOPSY RIGHT SHOLDER    Tonsillectomy      12 years old      Reviewed Social History:  Social History     Socioeconomic History    Marital status:    Tobacco Use    Smoking status: Never     Passive exposure: Never    Smokeless tobacco: Never   Vaping Use    Vaping Use: Former    Substances: THC, CBD    Devices: Pre-filled or refillable cartridge   Substance and Sexual Activity    Alcohol use: Yes     Alcohol/week: 6.0 standard drinks of alcohol     Types: 3 Glasses of wine, 3 Cans of beer per week     Comment: 3 DRINKS/WEEK    Drug use: Yes     Types: Cannabis      Reviewed:  Family History   Problem Relation Age of Onset    Cancer Father         thymus    Other (Thymus Cancer) Father     Skin cancer Mother         Non Melanoma    Skin cancer Maternal Grandmother         Non Melanoma    Cancer Maternal Grandfather         lung    Other (Lung cancer) Maternal Grandfather       Review of Systems:  Patient reports no rapid or irregular heartbeat. He reports no chest pain. He reports no nausea. He reports no vomiting. He reports no diarrhea. He reports no constipation. He reports no bright red blood per rectum. He reports no fatigue. He reports no blood in the urine, no changes in urinary habits: initiating urination requires straining, no changes in urinary habits: delays in starting hesitancy, and no change in urine appearance. He reports no headache. He reports no dizziness. He reports no easy bruising tendency. He reports no diffuse joint pains. He reports no bone pain. He reports no back pain. He reports no swollen glands. He reports no numbness. He reports no shortness of breath. He reports no change in a mole. He reports  No recent change in weight, no fever, no chills, and no sweating heavily at night        Physical Examination:  Constitutional: NAD.   Eyes: Sclera: non-icteric.   Neck: Neck: supple.   Lymph Nodes: Lymph Nodes no cervical LAD, supraclavicular  LAD, axillary LAD, or inguinal LAD.   Abdomen: Soft, no masses.  Musculoskeletal: Extremities: no edema.   Skin: Inspection and palpation: no jaundice.      Document Review:  2/12/2024 US R Axilla:  FINDINGS:  There is a 10 x 4 x 7 mm lymph node in the right axilla which has a normal sonographic appearance of a echogenic hilum.  Cortex measures less than 2 mm in thickness.  Vascular pedicle is noted.  There is a 2nd lymph node also in the right   axilla measuring 7 x 3 x 10 mm.  Again, the cortex measures less than 2 mm in thickness.  This lymph node also has a normal sonographic appearance.  No enlarged lymph nodes are identified.  No focal soft tissue mass or fluid collection is seen.      Procedure(s):  None     Assessment / Plan:  Malignant melanoma of right shoulder (HCC)   Doing well and remains MICHOACANO.  Continue immunotherapy as per Dr. Ortiz.  PET scan per Dr. Ortiz.  Return to clinic in 4 months with right axilla ultrasound.      Follow Up:  4 months, US and office visit       Electronically Signed by: Jakub Van MD

## 2024-03-13 ENCOUNTER — PATIENT OUTREACH (OUTPATIENT)
Dept: INTERNAL MEDICINE CLINIC | Facility: CLINIC | Age: 69
End: 2024-03-13

## 2024-03-13 ENCOUNTER — OFFICE VISIT (OUTPATIENT)
Dept: SURGERY | Facility: CLINIC | Age: 69
End: 2024-03-13
Payer: MEDICARE

## 2024-03-13 VITALS
WEIGHT: 115.38 LBS | TEMPERATURE: 98 F | SYSTOLIC BLOOD PRESSURE: 143 MMHG | RESPIRATION RATE: 20 BRPM | BODY MASS INDEX: 19 KG/M2 | DIASTOLIC BLOOD PRESSURE: 90 MMHG | HEART RATE: 64 BPM

## 2024-03-13 DIAGNOSIS — C43.61 MALIGNANT MELANOMA OF RIGHT SHOULDER (HCC): Primary | ICD-10-CM

## 2024-03-13 PROCEDURE — 99213 OFFICE O/P EST LOW 20 MIN: CPT | Performed by: SURGERY

## 2024-03-13 RX ORDER — MESALAMINE 1.2 G/1
1.2 TABLET, DELAYED RELEASE ORAL 2 TIMES DAILY
COMMUNITY
Start: 2024-03-07

## 2024-06-12 ENCOUNTER — HOSPITAL ENCOUNTER (OUTPATIENT)
Dept: ULTRASOUND IMAGING | Facility: HOSPITAL | Age: 69
Discharge: HOME | End: 2024-06-12
Payer: MEDICARE

## 2024-06-12 DIAGNOSIS — C43.61 MALIGNANT MELANOMA OF RIGHT SHOULDER (HCC): ICD-10-CM

## 2024-06-12 DIAGNOSIS — C77.9 METASTATIC MELANOMA TO LYMPH NODE (HCC): ICD-10-CM

## 2024-06-12 DIAGNOSIS — C77.3 SECONDARY AND UNSPECIFIED MALIGNANT NEOPLASM OF AXILLA AND UPPER LIMB LYMPH NODES (HCC): ICD-10-CM

## 2024-06-12 DIAGNOSIS — C77.9 MALIGNANT MELANOMA METASTATIC TO LYMPH NODE (HCC): ICD-10-CM

## 2024-06-12 PROCEDURE — 76882 US LMTD JT/FCL EVL NVASC XTR: CPT

## 2024-06-13 ENCOUNTER — TELEPHONE (OUTPATIENT)
Dept: SURGERY | Facility: CLINIC | Age: 69
End: 2024-06-13

## 2024-06-13 NOTE — TELEPHONE ENCOUNTER
Called patient, reviewed US findings. No lymph nodes identified. 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 He is recovering well and seeing his medical oncologist today to discuss any possible treatments. Asked patient to fax over last PET scan results as well.     ELISHA Espinoza

## 2024-06-18 PROBLEM — K52.9 COLITIS: Status: ACTIVE | Noted: 2024-06-18

## 2024-06-18 NOTE — PROGRESS NOTES
[facility]      Patient Name:  George Li   YOB: 1955   Gender:  Male   Appt Date:  6/19/2024   Provider:  Jakub Van MD     PATIENT PROVIDERS  Primary Care Provider:Yesi Singleton MD   Address: [address] [place] [address]   Phone #: [phone]       CHIEF COMPLAINT  Chief Complaint   Patient presents with    Follow - Up     Melanoma of R shoulder         PROBLEMS  Reviewed   Patient Active Problem List   Diagnosis    Malignant melanoma of right shoulder (HCC)    Inguinal hernia of left side without obstruction or gangrene    Elevated blood pressure reading    Colitis        History of Present Illness:  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 He states he has had this mole his entire life. He first noticed it changing 1 year ago (getting slightly wider, changing colors). In July he scratched the lesion and it began bleeding. He went to see Dr. Martinez ([facility]ermat[facility]) who biopsied the lesion which showed malignant melanoma 1.7mm thickness non [id number]. He was referred to surgical oncology there with the intention of being placed in a clinical trial which fell through. Patient attempted to follow up but states no one would return his calls or schedule him.   He was seen for treatment regarding the same lesion. States the initial lesion was flat and since the biopsy it has progressed to become more red, nodular and secretes clear drainage. Denies fever, chills or purulent discharge. Of note, was recently seen by Dr. Ortiz (heme/onc at [facility]) on 9/12 who agreed with the original plan of wide local excision with SLNB and did not recommend any tests or imaging prior to surgery. Denies weight loss, changes in appetite, or night sweats. Recalls one significant sun burn at age 10 and a few minor sunburns after that. Denies  sunscreen use.      10/10/2023: Wide excision melanoma right upper arm with sentinel lymph node biopsy --> Invasive melanoma with ulceration, 4.1 mm, margins negative, metastatic melanoma involving 1/5 lymph nodes     10/23/2023 - PET scan without metastatic disease  11/9/2023 - Brain MRI without metastatic disease  11/30/2023 - Started Pembro with Dr Wild Ortiz at [facility]    Interval History:    4/15/2024: PET scan done at [facility] showing MICHOACANO    5/31/2024: Patient was admitted for colitis secondary to immunotherapy. His immunotherapy has been held since.     6/12/2024: US right axilla with no lymph nodes identified.      Vital Signs:  BP (!) 160/102 (BP Location: Right arm, Patient Position: Sitting, Cuff Size: adult)   Pulse 91   Temp 98.2 °F (36.8 °C) (Temporal)   Resp 20   Wt 48.2 kg (106 lb 3.2 oz)   BMI 17.67 kg/m²      Medications Reviewed:    Current Outpatient Medications:     predniSONE 10 MG Oral Tab, Take 6 tablets (60 mg total) by mouth daily., Disp: , Rfl:     pantoprazole 40 MG Oral Tab EC, Take 1 tablet (40 mg total) by mouth daily., Disp: , Rfl:     zolpidem 5 MG Oral Tab, Take 1 tablet (5 mg total) by mouth nightly as needed., Disp: , Rfl:     acetaminophen 325 MG Oral Tab, Take 1 tablet (325 mg total) by mouth every 6 (six) hours as needed., Disp: , Rfl:     mesalamine 1.2 g Oral Tab EC, Take 1 tablet (1.2 g total) by mouth in the morning, at noon, in the evening, and at bedtime. Pt states only taking one a day, Disp: , Rfl:     PEMBROLIZUMAB IV, , Disp: , Rfl:     traMADol 50 MG Oral Tab, Take 1 tablet (50 mg total) by mouth every 6 (six) hours as needed for Pain. (Patient not taking: Reported on 11/8/2023), Disp: 20 tablet, Rfl: 0    mupirocin 2 % External Ointment, Apply 1 Application topically daily., Disp: 22 g, Rfl: 2    prednisoLONE 1 % Ophthalmic Suspension, Place 1 drop into the left eye 4 (four) times daily., Disp: , Rfl:      Allergies Reviewed:  Allergies    Allergen Reactions    Cat Hair Extract OTHER (SEE COMMENTS)     Itchy eyes, running nose and congestion        History:  Reviewed:  Past Medical History:    Arthritis    Back    Back problem    Cancer (HCC)    malignant melanoma right shoulder    Osteoarthritis    lower back    Visual impairment    glasses with progressive lenses      Reviewed:  Past Surgical History:   Procedure Laterality Date    Other surgical history Right 10/10/2023    EXCISION MELANOMA WITH SENTINEL LYMPH NODE BIOPSY RIGHT SHOLDER    Tonsillectomy      12 years old      Reviewed Social History:  Social History     Socioeconomic History    Marital status:    Tobacco Use    Smoking status: Never     Passive exposure: Never    Smokeless tobacco: Never   Vaping Use    Vaping status: Former    Substances: THC, CBD    Devices: Pre-filled or refillable cartridge   Substance and Sexual Activity    Alcohol use: Yes     Alcohol/week: 6.0 standard drinks of alcohol     Types: 3 Glasses of wine, 3 Cans of beer per week     Comment: 3 DRINKS/WEEK    Drug use: Yes     Types: Cannabis      Reviewed:  Family History   Problem Relation Age of Onset    Cancer Father         thymus    Other (Thymus Cancer) Father     Skin cancer Mother         Non Melanoma    Skin cancer Maternal Grandmother         Non Melanoma    Cancer Maternal Grandfather         lung    Other (Lung cancer) Maternal Grandfather       Review of Systems:  Patient reports no rapid or irregular heartbeat. He reports no chest pain. He reports no nausea. He reports no vomiting. He reports no diarrhea. He reports no constipation. He reports no bright red blood per rectum. He reports no fatigue. He reports no blood in the urine, no changes in urinary habits: initiating urination requires straining, no changes in urinary habits: delays in starting hesitancy, and no change in urine appearance. He reports no headache. He reports no dizziness. He reports no easy bruising tendency. He reports  no diffuse joint pains. He reports no bone pain. He reports no back pain. He reports no swollen glands. He reports no numbness. He reports no shortness of breath. He reports no change in a mole. He reports  No recent change in weight, no fever, no chills, and no sweating heavily at night        Physical Examination:  Constitutional: General Appearance: healthy-appearing, well-nourished, and well-developed. Level of Distress: NAD.   Eyes: Sclera: non-icteric.   Neck: Neck: supple.   Lymph Nodes: Lymph Nodes no cervical LAD, supraclavicular LAD, axillary LAD, or inguinal LAD.   Abdomen: Inspection and Palpation: no masses, tenderness (no guarding, no rebound), or CVA tenderness and soft and non-distended. Liver: no hepatomegaly. Spleen: no splenomegaly. Hernia: none palpable.   Musculoskeletal: Extremities: no edema.   Skin: The scalp an remainder of the skin do not show any evidence for new or suspicious lesions. The wide excision site is well healed. There is no evidence for local recurrence. There are no intransit metastases.     Document Review:  US Right Axilla 6/12/2024:  FINDINGS:    MASSES:  None.   FLUID COLLECTIONS:  None.   OTHER:  There are no lymph nodes identified within the right axilla.      Procedure(s):  None     Assessment / Plan:  Malignant melanoma of right shoulder (HCC)   Doing well and remains MICHOACANO at about 8 months.  Immunotherapy held because of colitis.  Patient remains on steroids.  PET scan per Dr. Ortiz July 2024.  Return to clinic in 4 months with right axilla ultrasound.    Colitis  - Secondary to immunotherapy      Follow Up:  10/2024       Electronically Signed by: Jakub Van MD

## 2024-06-19 ENCOUNTER — OFFICE VISIT (OUTPATIENT)
Dept: SURGERY | Facility: CLINIC | Age: 69
End: 2024-06-19

## 2024-06-19 VITALS
WEIGHT: 106.19 LBS | RESPIRATION RATE: 20 BRPM | TEMPERATURE: 98 F | HEART RATE: 91 BPM | DIASTOLIC BLOOD PRESSURE: 102 MMHG | BODY MASS INDEX: 18 KG/M2 | SYSTOLIC BLOOD PRESSURE: 160 MMHG

## 2024-06-19 DIAGNOSIS — K52.9 COLITIS: ICD-10-CM

## 2024-06-19 DIAGNOSIS — C43.61 MALIGNANT MELANOMA OF RIGHT SHOULDER (HCC): Primary | ICD-10-CM

## 2024-06-19 PROCEDURE — 99213 OFFICE O/P EST LOW 20 MIN: CPT | Performed by: SURGERY

## 2024-06-19 RX ORDER — ZOLPIDEM TARTRATE 5 MG/1
5 TABLET ORAL NIGHTLY PRN
COMMUNITY
Start: 2024-06-07

## 2024-06-19 RX ORDER — ACETAMINOPHEN 325 MG/1
325 TABLET ORAL EVERY 6 HOURS PRN
COMMUNITY

## 2024-06-19 RX ORDER — PANTOPRAZOLE SODIUM 40 MG/1
40 TABLET, DELAYED RELEASE ORAL DAILY
COMMUNITY
Start: 2024-06-03 | End: 2024-07-03

## 2024-06-19 RX ORDER — PREDNISONE 10 MG/1
60 TABLET ORAL DAILY
COMMUNITY
Start: 2024-06-03 | End: 2024-06-24

## 2024-10-23 PROBLEM — C77.9 METASTATIC MELANOMA TO LYMPH NODE (HCC): Status: ACTIVE | Noted: 2024-10-23

## 2024-11-13 ENCOUNTER — HOSPITAL ENCOUNTER (OUTPATIENT)
Dept: ULTRASOUND IMAGING | Age: 69
Discharge: HOME | End: 2024-11-13
Payer: MEDICARE

## 2024-11-13 ENCOUNTER — OFFICE VISIT (OUTPATIENT)
Dept: SURGERY | Facility: CLINIC | Age: 69
End: 2024-11-13
Payer: MEDICARE

## 2024-11-13 VITALS
DIASTOLIC BLOOD PRESSURE: 89 MMHG | WEIGHT: 114 LBS | HEART RATE: 64 BPM | RESPIRATION RATE: 16 BRPM | TEMPERATURE: 98 F | OXYGEN SATURATION: 99 % | BODY MASS INDEX: 19 KG/M2 | SYSTOLIC BLOOD PRESSURE: 196 MMHG

## 2024-11-13 DIAGNOSIS — C77.3 SECONDARY AND UNSPECIFIED MALIGNANT NEOPLASM OF AXILLA AND UPPER LIMB LYMPH NODES (HCC): ICD-10-CM

## 2024-11-13 DIAGNOSIS — C43.61 MALIGNANT MELANOMA OF RIGHT SHOULDER (HCC): ICD-10-CM

## 2024-11-13 DIAGNOSIS — C43.61 MALIGNANT MELANOMA OF RIGHT SHOULDER (HCC): Primary | ICD-10-CM

## 2024-11-13 DIAGNOSIS — C77.9 MALIGNANT MELANOMA METASTATIC TO LYMPH NODE (HCC): ICD-10-CM

## 2024-11-13 DIAGNOSIS — C77.9 METASTATIC MELANOMA TO LYMPH NODE (HCC): ICD-10-CM

## 2024-11-13 PROCEDURE — 99213 OFFICE O/P EST LOW 20 MIN: CPT | Performed by: SURGERY

## 2024-11-13 PROCEDURE — 76882 US LMTD JT/FCL EVL NVASC XTR: CPT
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
## 2025-03-07 ENCOUNTER — HOSPITAL ENCOUNTER (OUTPATIENT)
Dept: ULTRASOUND IMAGING | Age: 70
Discharge: HOME | End: 2025-03-07
Payer: MEDICARE

## 2025-03-07 DIAGNOSIS — C43.61 MALIGNANT MELANOMA OF RIGHT SHOULDER (HCC): ICD-10-CM

## 2025-03-07 DIAGNOSIS — C77.3 SECONDARY AND UNSPECIFIED MALIGNANT NEOPLASM OF AXILLA AND UPPER LIMB LYMPH NODES (HCC): ICD-10-CM

## 2025-03-07 DIAGNOSIS — C77.9 MALIGNANT MELANOMA METASTATIC TO LYMPH NODE (HCC): ICD-10-CM

## 2025-03-07 DIAGNOSIS — C77.9 METASTATIC MELANOMA TO LYMPH NODE (HCC): ICD-10-CM

## 2025-03-07 PROCEDURE — 76882 US LMTD JT/FCL EVL NVASC XTR: CPT

## 2025-03-19 ENCOUNTER — OFFICE VISIT (OUTPATIENT)
Dept: SURGERY | Facility: CLINIC | Age: 70
End: 2025-03-19
Payer: MEDICARE

## 2025-03-19 VITALS
WEIGHT: 115 LBS | RESPIRATION RATE: 16 BRPM | BODY MASS INDEX: 19 KG/M2 | OXYGEN SATURATION: 99 % | HEART RATE: 60 BPM | DIASTOLIC BLOOD PRESSURE: 81 MMHG | TEMPERATURE: 97 F | SYSTOLIC BLOOD PRESSURE: 123 MMHG

## 2025-03-19 DIAGNOSIS — C77.9 METASTATIC MELANOMA TO LYMPH NODE (HCC): ICD-10-CM

## 2025-03-19 DIAGNOSIS — N17.9 AKI (ACUTE KIDNEY INJURY): ICD-10-CM

## 2025-03-19 DIAGNOSIS — C43.61 MALIGNANT MELANOMA OF RIGHT SHOULDER (HCC): Primary | ICD-10-CM

## 2025-03-19 PROCEDURE — 99213 OFFICE O/P EST LOW 20 MIN: CPT | Performed by: SURGERY

## 2025-03-19 RX ORDER — LOSARTAN POTASSIUM 25 MG/1
1 TABLET ORAL DAILY
COMMUNITY
Start: 2024-11-11

## 2025-03-19 NOTE — PROGRESS NOTES
[facility]      Patient Name:  George Li   YOB: 1955   Gender:  Male   Appt Date:  3/19/2025   Provider:  Jakub Van MD     PATIENT PROVIDERS  Dermatologist: Jersey Schmidt MD       CHIEF COMPLAINT  Chief Complaint   Patient presents with    Follow - Up     Malignant melanoma of right shoulder         PROBLEMS  Reviewed   Patient Active Problem List   Diagnosis    Malignant melanoma of right shoulder (HCC)    Inguinal hernia of left side without obstruction or gangrene    Elevated blood pressure reading    Colitis    Metastatic melanoma to lymph node (HCC)        History of Present Illness:  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 His immunotherapy has been held since.   6/12/2024: US right axilla with no lymph nodes identified.  11/13/2024 ultrasound right axilla  03/07/2025 Ultrasound right axilla showed new lymph node with loss of fatty hilum. Discussed at tumor board w/ recommendation for continued surveillance.    03/19/2025: Patient presents for melanoma follow-up. He last saw his dermatologist in November of 2024. He denies any new lesions, swelling, weight loss or chills. He has had progressive worsening renal failure and has been following with nephrology and urology. He has a renal biopsy scheduled tomorrow at [facility]. He is also having left inguinal hernia repair in April. His next PET scan will be April 2025.     Interval  history:  Dealing with elevated creatinine; workup negative to date; kidney biopsy tomorrow     Vital Signs:  /81 (BP Location: Right arm, Cuff Size: adult)   Pulse 60   Temp 97.3 °F (36.3 °C) (Tympanic)   Resp 16   Wt 52.2 kg (115 lb)   SpO2 99%   BMI 19.14 kg/m²      Medications Reviewed:    Current Outpatient Medications:     losartan 25 MG Oral Tab, Take 1 tablet (25 mg total) by mouth daily., Disp: , Rfl:      Allergies Reviewed:  Allergies   Allergen Reactions    Cat Hair Extract OTHER (SEE COMMENTS)     Itchy eyes, running nose and congestion        History:  Reviewed:  Past Medical History:    Arthritis    Back    Back problem    Cancer (HCC)    malignant melanoma right shoulder    Osteoarthritis    lower back    Visual impairment    glasses with progressive lenses      Reviewed:  Past Surgical History:   Procedure Laterality Date    Other surgical history Right 10/10/2023    EXCISION MELANOMA WITH SENTINEL LYMPH NODE BIOPSY RIGHT SHOLDER    Tonsillectomy      12 years old      Reviewed Social History:  Social History     Socioeconomic History    Marital status:    Tobacco Use    Smoking status: Never     Passive exposure: Never    Smokeless tobacco: Never   Vaping Use    Vaping status: Former    Substances: THC, CBD    Devices: Pre-filled or refillable cartridge   Substance and Sexual Activity    Alcohol use: Yes     Alcohol/week: 6.0 standard drinks of alcohol     Types: 3 Glasses of wine, 3 Cans of beer per week     Comment: 3 DRINKS/WEEK    Drug use: Yes     Types: Cannabis      Reviewed:  Family History   Problem Relation Age of Onset    Cancer Father         thymus    Other (Thymus Cancer) Father     Skin cancer Mother         Non Melanoma    Skin cancer Maternal Grandmother         Non Melanoma    Cancer Maternal Grandfather         lung    Other (Lung cancer) Maternal Grandfather       Review of Systems:  Patient reports no rapid or irregular heartbeat. He reports no chest pain.  He reports no nausea. He reports no vomiting. He reports no diarrhea. He reports no constipation. He reports no bright red blood per rectum. He reports no fatigue. He reports no blood in the urine, no changes in urinary habits: initiating urination requires straining, no changes in urinary habits: delays in starting hesitancy, and no change in urine appearance. He reports no headache. He reports no dizziness. He reports no easy bruising tendency. He reports no diffuse joint pains. He reports no bone pain. He reports no back pain. He reports no swollen glands. He reports no numbness. He reports no shortness of breath. He reports no change in a mole. He reports  No recent change in weight, no fever, no chills, and no sweating heavily at night        Physical Examination:  Constitutional: NAD.   Eyes: Sclera: non-icteric.   Neck: Neck: supple.   Lymph Nodes: Lymph Nodes no cervical LAD, supraclavicular LAD, axillary LAD, or inguinal LAD.   Abdomen: Inspection and Palpation: no masses, tenderness (no guarding, no rebound), or CVA tenderness and soft and non-distended. Liver: no hepatomegaly. Spleen: no splenomegaly. Hernia: none palpable.   Musculoskeletal: Extremities: no edema.   Skin: The scalp an remainder of the skin do not show any evidence for new or suspicious lesions. The wide excision site is well healed. There is no evidence for local recurrence. There are no intransit metastases.     Document Review:  US Right Axilla 03/07/2025:  FINDINGS:    Multiple right axillary lymph nodes are noted.       There is a 1.2 x 0.3 x 0.5 cm lymph node which previously measured 1.4 x 0.2 x 0.6 cm.  Normal fatty hilum noted.     There is a 0.4 x 0.4 x 0.4 cm lymph node with loss of fatty hilum.  This previously measured 0.4 x 0.4 x 0.4 cm.     There is a 0.9 x 0.4 x 0.6 cm lymph node with normal fatty hilum which previously measured 0.8 x 0.4 x 0.5 cm.     Additional small lymph node with loss of fatty hilum measuring 0.3 x  0.3 x 0.3 cm.  This was not visualized on prior examination.       Small 0.2 x 0.2 x 0.1 cm lymph node noted.                   Impression   CONCLUSION:  Relatively stable axillary lymph nodes as described above.     ere are normal hilar vessels.  There is no focal nodularity or increased vascularity.      Procedure(s):  None     Assessment / Plan:  Malignant melanoma right shoulder  Doing well and remains MICHOACANO.  Continue surveillance.  Anticipate ultrasound right axilla in 4 months; after which patient will return to see me.  Anticipate PET per Dr. Ortiz.    CHAUNCEY  Workup underway      Follow Up:         Electronically Signed by: Jakub Van MD

## 2025-08-22 ENCOUNTER — TELEPHONE (OUTPATIENT)
Dept: SURGERY | Facility: CLINIC | Age: 70
End: 2025-08-22

## 2025-08-22 DIAGNOSIS — C77.9 METASTATIC MELANOMA TO LYMPH NODE (HCC): ICD-10-CM

## 2025-08-22 DIAGNOSIS — C77.3 MALIGNANT NEOPLASM METASTATIC TO LYMPH NODE OF AXILLA (HCC): Primary | ICD-10-CM

## 2025-08-26 ENCOUNTER — HOSPITAL ENCOUNTER (OUTPATIENT)
Dept: ULTRASOUND IMAGING | Facility: HOSPITAL | Age: 70
Discharge: HOME | End: 2025-08-26
Attending: PHYSICIAN ASSISTANT

## 2025-08-26 DIAGNOSIS — C77.3 MALIGNANT NEOPLASM METASTATIC TO LYMPH NODE OF AXILLA (HCC): ICD-10-CM

## 2025-08-26 DIAGNOSIS — C77.9 METASTATIC MELANOMA TO LYMPH NODE (HCC): ICD-10-CM

## 2025-08-26 PROCEDURE — 76882 US LMTD JT/FCL EVL NVASC XTR: CPT | Performed by: PHYSICIAN ASSISTANT

## 2025-08-29 PROBLEM — C43.9 METASTATIC MELANOMA (HCC): Status: ACTIVE | Noted: 2025-08-29

## (undated) DEVICE — SHEET,DRAPE,40X58,STERILE: Brand: MEDLINE

## (undated) DEVICE — SHEET, DRAPE, SPLIT, STERILE: Brand: MEDLINE

## (undated) DEVICE — LIGHT HANDLE

## (undated) DEVICE — PREMIUM WET SKIN PREP TRAY: Brand: MEDLINE INDUSTRIES, INC.

## (undated) DEVICE — STERILE POLYISOPRENE POWDER-FREE SURGICAL GLOVES: Brand: PROTEXIS

## (undated) DEVICE — PROXIMATE SKIN STAPLERS (35 WIDE) CONTAINS 35 STAINLESS STEEL STAPLES (FIXED HEAD): Brand: PROXIMATE

## (undated) DEVICE — HEAD AND NECK CDS-LF: Brand: MEDLINE INDUSTRIES, INC.

## (undated) DEVICE — SUTURE CHROMIC GUT SZ 3-0 L27IN ABSRB UD

## (undated) DEVICE — ELECTRODE ES L2.75IN XLN STD BLDE MOD E-Z CLN

## (undated) DEVICE — SUTURE. PROL SZ 4-0 L36IN NONABSORBABLE BLU .

## (undated) DEVICE — 3M™ IOBAN™ 2 ANTIMICROBIAL INCISE DRAPE 6648EZ: Brand: IOBAN™ 2

## (undated) DEVICE — SUTURE MCRYL SZ 4-0 L18IN ABSRB UD L19MM PS-2

## (undated) DEVICE — SUTURE VCRL SZ 3-0 L27IN ABSRB UD L26MM SH

## (undated) DEVICE — 3M™ STERI-STRIP™ REINFORCED ADHESIVE SKIN CLOSURES, R1547, 1/2 IN X 4 IN (12 MM X 100 MM), 6 STRIPS/ENVELOPE: Brand: 3M™ STERI-STRIP™

## (undated) DEVICE — SUTURE VCRL SZ 3-0 L27IN ABSRB UD L17MM RB-1

## (undated) DEVICE — SUTURE PERMAHAND SZ 2-0 L30IN NONABSORBABLE

## (undated) DEVICE — PAD N ADH W3XL8IN POLY COT SFT PERF FLM ABSRB

## (undated) DEVICE — STANDARD HYPODERMIC NEEDLE,POLYPROPYLENE HUB: Brand: MONOJECT

## (undated) DEVICE — BLADE DISP SS #10 WIDE FOIL

## (undated) DEVICE — MARKER SKIN PREP RESIST STRL

## (undated) DEVICE — OCCLUSIVE GAUZE STRIP OVERWRAP,3% BISMUTH TRIBROMOPHENATE IN PETROLATUM BLEND: Brand: XEROFORM

## (undated) DEVICE — CSTM UNIVERSAL DRAPE PK: Brand: MEDLINE INDUSTRIES, INC.

## (undated) DEVICE — SOLUTION IRRIG 1000ML 0.9% NACL USP BTL

## (undated) DEVICE — CABLE BPLR L12FT FLYING LD DISP

## (undated) NOTE — LETTER
[facility], [address] [address]  PH: [phone]  FAX: [phone]    Medical Clearance Request    Kori Platt MD  [id number] [facility] [id number]  Via In Basket    The patient listed below is scheduled for surgery and needs the following pre-op labs and/or clearance prior to surgery. The patient has been instructed to schedule an appointment with you for a pre-op physical.      Patient: Naomi Meraz  : 1955    Surgeon:  Dr. Maureen Bolton    Procedure: Wide excision melanoma right upper arm with sentinel lymph node biopsy    Surgery Date:  10/10/23  Location:  [facility]    outpatient    [] Hematocrit/Hemogram [x] EKG     [x] CBC    [] Chest X-Ray    [x] CMP    [] PT/PTT    [] Urinalysis   [] MRSA Nasal Culture    [] Urinalysis with reflex  [] History and Physical    [] Urine pregnancy  [x] Medical Clearance    [] Qualitative HCG (blood) [] Cardiac Clearance      Please fax to fax number indicated above when completed. Call [phone] with any questions. Thank you for your prompt attention to these requirements.     [facility]